# Patient Record
Sex: FEMALE | Race: WHITE | NOT HISPANIC OR LATINO
[De-identification: names, ages, dates, MRNs, and addresses within clinical notes are randomized per-mention and may not be internally consistent; named-entity substitution may affect disease eponyms.]

---

## 2017-06-12 ENCOUNTER — RESULT REVIEW (OUTPATIENT)
Age: 82
End: 2017-06-12

## 2017-06-12 ENCOUNTER — APPOINTMENT (OUTPATIENT)
Dept: DERMATOLOGY | Facility: CLINIC | Age: 82
End: 2017-06-12

## 2017-07-11 ENCOUNTER — APPOINTMENT (OUTPATIENT)
Dept: DERMATOLOGY | Facility: CLINIC | Age: 82
End: 2017-07-11

## 2017-09-06 ENCOUNTER — APPOINTMENT (OUTPATIENT)
Dept: DERMATOLOGY | Facility: CLINIC | Age: 82
End: 2017-09-06
Payer: MEDICARE

## 2017-09-06 PROCEDURE — 99212 OFFICE O/P EST SF 10 MIN: CPT | Mod: 25

## 2017-09-06 PROCEDURE — 11901 INJECT SKIN LESIONS >7: CPT

## 2017-09-25 ENCOUNTER — APPOINTMENT (OUTPATIENT)
Dept: DERMATOLOGY | Facility: CLINIC | Age: 82
End: 2017-09-25

## 2017-12-02 ENCOUNTER — APPOINTMENT (OUTPATIENT)
Dept: DERMATOLOGY | Facility: CLINIC | Age: 82
End: 2017-12-02

## 2018-10-14 ENCOUNTER — INPATIENT (INPATIENT)
Facility: HOSPITAL | Age: 83
LOS: 3 days | Discharge: NURSING, REHAB OR LONG-TERM CARE | DRG: 481 | End: 2018-10-18
Attending: INTERNAL MEDICINE | Admitting: STUDENT IN AN ORGANIZED HEALTH CARE EDUCATION/TRAINING PROGRAM
Payer: MEDICARE

## 2018-10-14 VITALS
WEIGHT: 100.09 LBS | TEMPERATURE: 98 F | HEART RATE: 69 BPM | RESPIRATION RATE: 18 BRPM | HEIGHT: 63 IN | SYSTOLIC BLOOD PRESSURE: 127 MMHG | OXYGEN SATURATION: 97 % | DIASTOLIC BLOOD PRESSURE: 74 MMHG

## 2018-10-14 PROCEDURE — 99285 EMERGENCY DEPT VISIT HI MDM: CPT

## 2018-10-14 NOTE — ED ADULT TRIAGE NOTE - CHIEF COMPLAINT QUOTE
pt biba s/p mechanical fall.  pt states she was washing dishes, she turned around and missed her walker.  c/o left hip pain.  no head trauma.  no blood thinners.  no obvious deformities.

## 2018-10-15 ENCOUNTER — TRANSCRIPTION ENCOUNTER (OUTPATIENT)
Age: 83
End: 2018-10-15

## 2018-10-15 DIAGNOSIS — Z90.11 ACQUIRED ABSENCE OF RIGHT BREAST AND NIPPLE: Chronic | ICD-10-CM

## 2018-10-15 DIAGNOSIS — I48.2 CHRONIC ATRIAL FIBRILLATION: ICD-10-CM
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
## 2018-10-15 DIAGNOSIS — Z29.9 ENCOUNTER FOR PROPHYLACTIC MEASURES, UNSPECIFIED: ICD-10-CM

## 2018-10-15 DIAGNOSIS — S72.009A FRACTURE OF UNSPECIFIED PART OF NECK OF UNSPECIFIED FEMUR, INITIAL ENCOUNTER FOR CLOSED FRACTURE: ICD-10-CM

## 2018-10-15 DIAGNOSIS — S72.142A DISPLACED INTERTROCHANTERIC FRACTURE OF LEFT FEMUR, INITIAL ENCOUNTER FOR CLOSED FRACTURE: ICD-10-CM

## 2018-10-15 LAB
ALBUMIN SERPL ELPH-MCNC: 3.9 G/DL — SIGNIFICANT CHANGE UP (ref 3.3–5.2)
ALBUMIN SERPL ELPH-MCNC: 4 G/DL — SIGNIFICANT CHANGE UP (ref 3.3–5.2)
ALP SERPL-CCNC: 73 U/L — SIGNIFICANT CHANGE UP (ref 40–120)
ALP SERPL-CCNC: 77 U/L — SIGNIFICANT CHANGE UP (ref 40–120)
ALT FLD-CCNC: 11 U/L — SIGNIFICANT CHANGE UP
ALT FLD-CCNC: 13 U/L — SIGNIFICANT CHANGE UP
ANION GAP SERPL CALC-SCNC: 14 MMOL/L — SIGNIFICANT CHANGE UP (ref 5–17)
ANION GAP SERPL CALC-SCNC: 14 MMOL/L — SIGNIFICANT CHANGE UP (ref 5–17)
APTT BLD: 26.2 SEC — LOW (ref 27.5–37.4)
AST SERPL-CCNC: 26 U/L — SIGNIFICANT CHANGE UP
AST SERPL-CCNC: 27 U/L — SIGNIFICANT CHANGE UP
BASOPHILS # BLD AUTO: 0 K/UL — SIGNIFICANT CHANGE UP (ref 0–0.2)
BASOPHILS # BLD AUTO: 0 K/UL — SIGNIFICANT CHANGE UP (ref 0–0.2)
BASOPHILS NFR BLD AUTO: 0.1 % — SIGNIFICANT CHANGE UP (ref 0–2)
BASOPHILS NFR BLD AUTO: 0.1 % — SIGNIFICANT CHANGE UP (ref 0–2)
BILIRUB SERPL-MCNC: 0.2 MG/DL — LOW (ref 0.4–2)
BILIRUB SERPL-MCNC: 0.4 MG/DL — SIGNIFICANT CHANGE UP (ref 0.4–2)
BLD GP AB SCN SERPL QL: SIGNIFICANT CHANGE UP
BUN SERPL-MCNC: 24 MG/DL — HIGH (ref 8–20)
BUN SERPL-MCNC: 25 MG/DL — HIGH (ref 8–20)
CALCIUM SERPL-MCNC: 9.5 MG/DL — SIGNIFICANT CHANGE UP (ref 8.6–10.2)
CALCIUM SERPL-MCNC: 9.8 MG/DL — SIGNIFICANT CHANGE UP (ref 8.6–10.2)
CHLORIDE SERPL-SCNC: 101 MMOL/L — SIGNIFICANT CHANGE UP (ref 98–107)
CHLORIDE SERPL-SCNC: 102 MMOL/L — SIGNIFICANT CHANGE UP (ref 98–107)
CO2 SERPL-SCNC: 21 MMOL/L — LOW (ref 22–29)
CO2 SERPL-SCNC: 21 MMOL/L — LOW (ref 22–29)
CREAT SERPL-MCNC: 0.53 MG/DL — SIGNIFICANT CHANGE UP (ref 0.5–1.3)
CREAT SERPL-MCNC: 0.53 MG/DL — SIGNIFICANT CHANGE UP (ref 0.5–1.3)
EOSINOPHIL # BLD AUTO: 0 K/UL — SIGNIFICANT CHANGE UP (ref 0–0.5)
EOSINOPHIL # BLD AUTO: 0 K/UL — SIGNIFICANT CHANGE UP (ref 0–0.5)
EOSINOPHIL NFR BLD AUTO: 0.1 % — SIGNIFICANT CHANGE UP (ref 0–6)
EOSINOPHIL NFR BLD AUTO: 0.6 % — SIGNIFICANT CHANGE UP (ref 0–6)
GLUCOSE SERPL-MCNC: 103 MG/DL — SIGNIFICANT CHANGE UP (ref 70–115)
GLUCOSE SERPL-MCNC: 118 MG/DL — HIGH (ref 70–115)
HCT VFR BLD CALC: 32.4 % — LOW (ref 37–47)
HCT VFR BLD CALC: 34.2 % — LOW (ref 37–47)
HGB BLD-MCNC: 10.2 G/DL — LOW (ref 12–16)
HGB BLD-MCNC: 11.1 G/DL — LOW (ref 12–16)
INR BLD: 0.96 RATIO — SIGNIFICANT CHANGE UP (ref 0.88–1.16)
INR BLD: 1 RATIO — SIGNIFICANT CHANGE UP (ref 0.88–1.16)
LIDOCAIN IGE QN: 62 U/L — HIGH (ref 22–51)
LYMPHOCYTES # BLD AUTO: 1 K/UL — SIGNIFICANT CHANGE UP (ref 1–4.8)
LYMPHOCYTES # BLD AUTO: 1.1 K/UL — SIGNIFICANT CHANGE UP (ref 1–4.8)
LYMPHOCYTES # BLD AUTO: 14.4 % — LOW (ref 20–55)
LYMPHOCYTES # BLD AUTO: 8.6 % — LOW (ref 20–55)
MAGNESIUM SERPL-MCNC: 2 MG/DL — SIGNIFICANT CHANGE UP (ref 1.8–2.6)
MCHC RBC-ENTMCNC: 29.7 PG — SIGNIFICANT CHANGE UP (ref 27–31)
MCHC RBC-ENTMCNC: 29.9 PG — SIGNIFICANT CHANGE UP (ref 27–31)
MCHC RBC-ENTMCNC: 31.5 G/DL — LOW (ref 32–36)
MCHC RBC-ENTMCNC: 32.5 G/DL — SIGNIFICANT CHANGE UP (ref 32–36)
MCV RBC AUTO: 92.2 FL — SIGNIFICANT CHANGE UP (ref 81–99)
MCV RBC AUTO: 94.2 FL — SIGNIFICANT CHANGE UP (ref 81–99)
MONOCYTES # BLD AUTO: 0.5 K/UL — SIGNIFICANT CHANGE UP (ref 0–0.8)
MONOCYTES # BLD AUTO: 0.6 K/UL — SIGNIFICANT CHANGE UP (ref 0–0.8)
MONOCYTES NFR BLD AUTO: 5.5 % — SIGNIFICANT CHANGE UP (ref 3–10)
MONOCYTES NFR BLD AUTO: 6.5 % — SIGNIFICANT CHANGE UP (ref 3–10)
NEUTROPHILS # BLD AUTO: 6 K/UL — SIGNIFICANT CHANGE UP (ref 1.8–8)
NEUTROPHILS # BLD AUTO: 9.5 K/UL — HIGH (ref 1.8–8)
NEUTROPHILS NFR BLD AUTO: 78 % — HIGH (ref 37–73)
NEUTROPHILS NFR BLD AUTO: 85.3 % — HIGH (ref 37–73)
PHOSPHATE SERPL-MCNC: 3.4 MG/DL — SIGNIFICANT CHANGE UP (ref 2.4–4.7)
PLATELET # BLD AUTO: 303 K/UL — SIGNIFICANT CHANGE UP (ref 150–400)
PLATELET # BLD AUTO: 361 K/UL — SIGNIFICANT CHANGE UP (ref 150–400)
POTASSIUM SERPL-MCNC: 3.9 MMOL/L — SIGNIFICANT CHANGE UP (ref 3.5–5.3)
POTASSIUM SERPL-MCNC: 4.2 MMOL/L — SIGNIFICANT CHANGE UP (ref 3.5–5.3)
POTASSIUM SERPL-SCNC: 3.9 MMOL/L — SIGNIFICANT CHANGE UP (ref 3.5–5.3)
POTASSIUM SERPL-SCNC: 4.2 MMOL/L — SIGNIFICANT CHANGE UP (ref 3.5–5.3)
PROT SERPL-MCNC: 7.9 G/DL — SIGNIFICANT CHANGE UP (ref 6.6–8.7)
PROT SERPL-MCNC: 7.9 G/DL — SIGNIFICANT CHANGE UP (ref 6.6–8.7)
PROTHROM AB SERPL-ACNC: 10.5 SEC — SIGNIFICANT CHANGE UP (ref 9.8–12.7)
PROTHROM AB SERPL-ACNC: 11 SEC — SIGNIFICANT CHANGE UP (ref 9.8–12.7)
RBC # BLD: 3.44 M/UL — LOW (ref 4.4–5.2)
RBC # BLD: 3.71 M/UL — LOW (ref 4.4–5.2)
RBC # FLD: 14.1 % — SIGNIFICANT CHANGE UP (ref 11–15.6)
RBC # FLD: 14.6 % — SIGNIFICANT CHANGE UP (ref 11–15.6)
SODIUM SERPL-SCNC: 136 MMOL/L — SIGNIFICANT CHANGE UP (ref 135–145)
SODIUM SERPL-SCNC: 137 MMOL/L — SIGNIFICANT CHANGE UP (ref 135–145)
TROPONIN T SERPL-MCNC: <0.01 NG/ML — SIGNIFICANT CHANGE UP (ref 0–0.06)
TYPE + AB SCN PNL BLD: SIGNIFICANT CHANGE UP
WBC # BLD: 11.2 K/UL — HIGH (ref 4.8–10.8)
WBC # BLD: 7.7 K/UL — SIGNIFICANT CHANGE UP (ref 4.8–10.8)
WBC # FLD AUTO: 11.2 K/UL — HIGH (ref 4.8–10.8)
WBC # FLD AUTO: 7.7 K/UL — SIGNIFICANT CHANGE UP (ref 4.8–10.8)

## 2018-10-15 PROCEDURE — 93306 TTE W/DOPPLER COMPLETE: CPT | Mod: 26

## 2018-10-15 PROCEDURE — 70450 CT HEAD/BRAIN W/O DYE: CPT | Mod: 26

## 2018-10-15 PROCEDURE — 73502 X-RAY EXAM HIP UNI 2-3 VIEWS: CPT | Mod: 26,LT

## 2018-10-15 PROCEDURE — 99223 1ST HOSP IP/OBS HIGH 75: CPT

## 2018-10-15 PROCEDURE — 93010 ELECTROCARDIOGRAM REPORT: CPT

## 2018-10-15 PROCEDURE — 71045 X-RAY EXAM CHEST 1 VIEW: CPT | Mod: 26

## 2018-10-15 PROCEDURE — 99222 1ST HOSP IP/OBS MODERATE 55: CPT | Mod: 57

## 2018-10-15 PROCEDURE — 73552 X-RAY EXAM OF FEMUR 2/>: CPT | Mod: 26,LT

## 2018-10-15 PROCEDURE — 99233 SBSQ HOSP IP/OBS HIGH 50: CPT

## 2018-10-15 PROCEDURE — 12345: CPT | Mod: NC

## 2018-10-15 RX ORDER — VENLAFAXINE HCL 75 MG
37.5 CAPSULE, EXT RELEASE 24 HR ORAL DAILY
Qty: 0 | Refills: 0 | Status: DISCONTINUED | OUTPATIENT
Start: 2018-10-15 | End: 2018-10-16

## 2018-10-15 RX ORDER — SODIUM CHLORIDE 9 MG/ML
1000 INJECTION, SOLUTION INTRAVENOUS
Qty: 0 | Refills: 0 | Status: DISCONTINUED | OUTPATIENT
Start: 2018-10-15 | End: 2018-10-16

## 2018-10-15 RX ORDER — ONDANSETRON 8 MG/1
4 TABLET, FILM COATED ORAL EVERY 6 HOURS
Qty: 0 | Refills: 0 | Status: DISCONTINUED | OUTPATIENT
Start: 2018-10-15 | End: 2018-10-16

## 2018-10-15 RX ORDER — PANTOPRAZOLE SODIUM 20 MG/1
0 TABLET, DELAYED RELEASE ORAL
Qty: 0 | Refills: 0 | COMMUNITY

## 2018-10-15 RX ORDER — MORPHINE SULFATE 50 MG/1
2 CAPSULE, EXTENDED RELEASE ORAL ONCE
Qty: 0 | Refills: 0 | Status: DISCONTINUED | OUTPATIENT
Start: 2018-10-15 | End: 2018-10-15

## 2018-10-15 RX ORDER — ALPRAZOLAM 0.25 MG
0 TABLET ORAL
Qty: 0 | Refills: 0 | COMMUNITY

## 2018-10-15 RX ORDER — PANTOPRAZOLE SODIUM 20 MG/1
40 TABLET, DELAYED RELEASE ORAL
Qty: 0 | Refills: 0 | Status: DISCONTINUED | OUTPATIENT
Start: 2018-10-15 | End: 2018-10-16

## 2018-10-15 RX ORDER — ENOXAPARIN SODIUM 100 MG/ML
40 INJECTION SUBCUTANEOUS ONCE
Qty: 0 | Refills: 0 | Status: COMPLETED | OUTPATIENT
Start: 2018-10-15 | End: 2018-10-15

## 2018-10-15 RX ORDER — MORPHINE SULFATE 50 MG/1
4 CAPSULE, EXTENDED RELEASE ORAL EVERY 6 HOURS
Qty: 0 | Refills: 0 | Status: DISCONTINUED | OUTPATIENT
Start: 2018-10-15 | End: 2018-10-16

## 2018-10-15 RX ORDER — OXYCODONE HYDROCHLORIDE 5 MG/1
5 TABLET ORAL EVERY 4 HOURS
Qty: 0 | Refills: 0 | Status: DISCONTINUED | OUTPATIENT
Start: 2018-10-15 | End: 2018-10-16

## 2018-10-15 RX ORDER — OXYCODONE HYDROCHLORIDE 5 MG/1
10 TABLET ORAL EVERY 4 HOURS
Qty: 0 | Refills: 0 | Status: DISCONTINUED | OUTPATIENT
Start: 2018-10-15 | End: 2018-10-16

## 2018-10-15 RX ORDER — ALPRAZOLAM 0.25 MG
0.5 TABLET ORAL EVERY 6 HOURS
Qty: 0 | Refills: 0 | Status: DISCONTINUED | OUTPATIENT
Start: 2018-10-15 | End: 2018-10-16

## 2018-10-15 RX ORDER — CEFAZOLIN SODIUM 1 G
2000 VIAL (EA) INJECTION ONCE
Qty: 0 | Refills: 0 | Status: DISCONTINUED | OUTPATIENT
Start: 2018-10-15 | End: 2018-10-16

## 2018-10-15 RX ORDER — VENLAFAXINE HCL 75 MG
1 CAPSULE, EXT RELEASE 24 HR ORAL
Qty: 0 | Refills: 0 | COMMUNITY

## 2018-10-15 RX ORDER — CHOLECALCIFEROL (VITAMIN D3) 125 MCG
1 CAPSULE ORAL
Qty: 0 | Refills: 0 | COMMUNITY

## 2018-10-15 RX ADMIN — Medication 37.5 MILLIGRAM(S): at 12:28

## 2018-10-15 RX ADMIN — ENOXAPARIN SODIUM 40 MILLIGRAM(S): 100 INJECTION SUBCUTANEOUS at 12:28

## 2018-10-15 RX ADMIN — PANTOPRAZOLE SODIUM 40 MILLIGRAM(S): 20 TABLET, DELAYED RELEASE ORAL at 12:28

## 2018-10-15 RX ADMIN — MORPHINE SULFATE 2 MILLIGRAM(S): 50 CAPSULE, EXTENDED RELEASE ORAL at 04:28

## 2018-10-15 RX ADMIN — MORPHINE SULFATE 4 MILLIGRAM(S): 50 CAPSULE, EXTENDED RELEASE ORAL at 22:35

## 2018-10-15 RX ADMIN — SODIUM CHLORIDE 80 MILLILITER(S): 9 INJECTION, SOLUTION INTRAVENOUS at 22:23

## 2018-10-15 RX ADMIN — MORPHINE SULFATE 4 MILLIGRAM(S): 50 CAPSULE, EXTENDED RELEASE ORAL at 22:24

## 2018-10-15 RX ADMIN — MORPHINE SULFATE 2 MILLIGRAM(S): 50 CAPSULE, EXTENDED RELEASE ORAL at 03:04

## 2018-10-15 RX ADMIN — SODIUM CHLORIDE 80 MILLILITER(S): 9 INJECTION, SOLUTION INTRAVENOUS at 04:28

## 2018-10-15 NOTE — H&P ADULT - FAMILY HISTORY
Father  Still living? Unknown  No family history of disorders, Age at diagnosis: Age Unknown     Mother  Still living? Unknown  No family history of disorders, Age at diagnosis: Age Unknown

## 2018-10-15 NOTE — H&P ADULT - ASSESSMENT
84 yo female with pmh/o HTN, a-fib and breast CA, b/l prox humureus fxs in 2015, who ambulates with walker and appears to have frequent falls as per EMR review, who is admitted today due to Left IT fracture after sustaining mechanical fall.

## 2018-10-15 NOTE — ED PROVIDER NOTE - OBJECTIVE STATEMENT
86 y/o F pt with hx of HTN presents to the ED c/o of fall. Pt reports she was at the sink washing the dishes, had her walker close, heard a noise, she tried to grab walker but couldn't reach it, and then she fell on left side, wasn't able to get up. Pt reports she hit the back of her head, blade, arm, and shoulder. Denies SOB, LOC, and numbness. No further acute complaints at this time. 84 y/o F pt with hx of HTN presents to the ED c/o a fall. Pt reports she was at the sink washing the dishes, she had her walker close, heard a noise, and she tried to grab her walker but couldn't reach it, and then she fell on her left side, and wasn't able to get up. Pt reports she hit the back of her head, shoulder blade, arm, and shoulder. Denies SOB, LOC, and numbness. No further acute complaints at this time. 84 y/o F pt with hx of HTN, a-fib and breast CA presents to the ED c/o L hip pain s/p mechanical trip and fall tonight while at home. Pt reports she was at the sink washing the dishes, when she went to walk away from the sink, reached for her walker, but missed and fell on her left side and hit the back of her head. She was unable to get herself off the ground or move after the fall. Denies chest pain, difficulty breathing, headache, LOC, or tingling/numbness. No further acute complaints at this time. 86 y/o F pt with hx of HTN, a-fib and breast CA and PSHx of partial R mastectomy presents to the ED c/o L hip pain s/p mechanical trip and fall tonight while at home. Pt reports she was at the sink washing the dishes, when she went to walk away from the sink, reached for her walker, but missed, fell on her left side and hit the back of her head. She was unable to get herself off the ground or move after the fall. Her pain is made worse with movement and finds slight relief with immobilization. Denies chest pain, difficulty breathing, headache, LOC, or tingling/numbness. She does note take anticoagulation medications. She did not take medications OTC for pain. No further acute complaints at this time.

## 2018-10-15 NOTE — PROGRESS NOTE ADULT - SUBJECTIVE AND OBJECTIVE BOX
ADRIÁN MARTE    [id number]    85y      Female    CC: left hip fracture    INTERVAL HPI/OVERNIGHT EVENTS:  Pt is an 84 y/o F pt with a pmhx of HTN, a-fib and breast CA, b/l prox humerus fxs, frequent falls as per EMR review and PSHx of partial R mastectomy who presents to the ED c/o L hip pain with inability to ambulate after having a fall while at home. Pt ambulates with walker and tripped attempting to reach it to ambulate away from sink where she was washing dishes. Pt had similar episode in 2016 requiring sutures for head laceration and prior to that for which she sustained b/l humeral fractures. Pt did hit head but denies losing consciousness and any preceding symptoms including palpitations, sob, chest pain, HA, visual changes, numbness, tingling, tongue biting, urinary or fecal incontinence.     this morning pt states pain is well controlled and waiting for her surgery to take place, which will take place 10/16/18    REVIEW OF SYSTEMS:    CONSTITUTIONAL: No fever, weight loss, or fatigue  RESPIRATORY: No cough, wheezing, hemoptysis; No shortness of breath  CARDIOVASCULAR: No chest pain, palpitations  GASTROINTESTINAL: No abdominal or epigastric pain. No nausea, vomiting      Vital Signs Last 24 Hrs  T(C): 36.9 (15 Oct 2018 10:00), Max: 36.9 (15 Oct 2018 10:00)  T(F): 98.5 (15 Oct 2018 10:00), Max: 98.5 (15 Oct 2018 10:00)  HR: 78 (15 Oct 2018 10:00) (68 - 78)  BP: 106/73 (15 Oct 2018 10:00) (106/73 - 127/74)  BP(mean): --  RR: 18 (15 Oct 2018 10:00) (18 - 18)  SpO2: 98% (15 Oct 2018 10:00) (97% - 99%)    PHYSICAL EXAM:    GENERAL: NAD, well-groomed  HEENT: PERRL, +EOMI  CHEST/LUNG: Clear to auscultation bilaterally; No wheezing  HEART: S1S2+, Regular rate and rhythm; No murmurs  ABDOMEN: Soft, Nontender, Nondistended; Bowel sounds present  EXTREMITIES:  2+ Peripheral Pulses, No clubbing, cyanosis, or edema      LABS:                        11.1   11.2  )-----------( 361      ( 15 Oct 2018 03:12 )             34.2     10-15    137  |  102  |  25.0<H>  ----------------------------<  118<H>  4.2   |  21.0<L>  |  0.53    Ca    9.5      15 Oct 2018 03:12    TPro  7.9  /  Alb  4.0  /  TBili  0.2<L>  /  DBili  x   /  AST  27  /  ALT  13  /  AlkPhos  73  10-15    PT/INR - ( 15 Oct 2018 03:12 )   PT: 11.0 sec;   INR: 1.00 ratio         MEDICATIONS  (STANDING):  ceFAZolin   IVPB 2000 milliGRAM(s) IV Intermittent once  lactated ringers. 1000 milliLiter(s) (80 mL/Hr) IV Continuous <Continuous>  pantoprazole    Tablet 40 milliGRAM(s) Oral before breakfast  venlafaxine XR. 37.5 milliGRAM(s) Oral daily    MEDICATIONS  (PRN):  ALPRAZolam 0.5 milliGRAM(s) Oral every 6 hours PRN anxiety  morphine  - Injectable 4 milliGRAM(s) IV Push every 6 hours PRN Severe Pain (7 - 10)  ondansetron Injectable 4 milliGRAM(s) IV Push every 6 hours PRN Nausea  oxyCODONE    IR 5 milliGRAM(s) Oral every 4 hours PRN Mild Pain (1 - 3)  oxyCODONE    IR 10 milliGRAM(s) Oral every 4 hours PRN Moderate Pain (4 - 6)      RADIOLOGY & ADDITIONAL TESTS:  2Decho:  1. Technically difficult study.   2. Normal global left ventricular systolic function.   3. Left ventricular ejection fraction, by visual estimation, is 65 to   70%.   4. Basal inferior segment is abnormal as described above.   5. Spectral Doppler shows impaired relaxation pattern of left   ventricular myocardial filling (Grade I diastolic dysfunction).   6. Posterior leaflet of mitral valve is thickened. There is mild   posterior mitral valve prolapse, mild mitral valve regurgitaiton.   7. Elevated left atrial and left ventricular end-diastolic pressures.   8. Severely enlarged left atrium.   9. Mild aortic regurgitation.  10. Sclerotic aortic valve with decreased opening.No significant aortic   valve stenosis.  11. Trace tricuspid regurgitation.  12. Estimated pulmonary artery systolic pressure is 35.6 mmHg assuming a   right atrial pressure of 10 mmHg, which is consistent with borderline   pulmonary hypertension.  13. There is no evidence of pericardial effusion.  14. Incidental finding of gallstones.    [id number] Levi Calvert MD, Electronically signed on 10/15/2018 at 11:37:22   AM

## 2018-10-15 NOTE — ED ADULT NURSE NOTE - NSIMPLEMENTINTERV_GEN_ALL_ED
Implemented All Universal Safety Interventions:  [place] to call system. Call bell, personal items and telephone within reach. Instruct patient to call for assistance. Room bathroom lighting operational. Non-slip footwear when patient is off stretcher. Physically safe environment: no spills, clutter or unnecessary equipment. Stretcher in lowest position, wheels locked, appropriate side rails in place.

## 2018-10-15 NOTE — H&P ADULT - PMH
Atrial fibrillation    Breast cancer    Closed fracture of lower extremity, unspecified laterality, initial encounter    Fall, initial encounter    Hypertension

## 2018-10-15 NOTE — PROGRESS NOTE ADULT - SUBJECTIVE AND OBJECTIVE BOX
Pt Seen.  Chart Reviewed.  This is a 85 y.o. female presents for Intramedullary Nailing Left Intertrochanteric Fracture, S/P Fall at Home.  Ambulates with walker.  PMHX: Afib, HTN, Breast Ca, S/P Partial Mastectomy.  No anticoagulation, No antihypertensive meds.  EKG NSR.    ECHO: Nml LV Systolic Function.  EF 60-65%, Severe LAE.  Meds: Ancef, Protonix, Lovenox, Venlafaxene.  Labs: Hgb 11.1, Hct 34.2, plts 361    VS: 36.8  68  118/72  18  99%  cxr: Cardiomegaly, Small Left Pleural Effusion  NKDA  MP 2  45.4 KG    Plan: General Anesthesia vs Regional.  Cardiac Clearance on chart.  Will continue to monitor.  Labs reviewed.

## 2018-10-15 NOTE — H&P ADULT - HISTORY OF PRESENT ILLNESS
Pt is an 86 y/o F pt with a pmhx of HTN, a-fib and breast CA, b/l prox humureus fxs, frequent falls as per EMR review and PSHx of partial R mastectomy who today  presents to the ED c/o L hip pain with inability to ambulate after having a fall tonight while at home. Pt ambulates with walker and tripped attempting to reach it to ambulate away from sink where she was washing dishes. Pt had similar episode in 2016 requiring sutures for head laceration and prior to that for which she sustained b/l humeral fractures. Pt did hit head but denies losing consciousness and any preceding symptoms including palpitations, sob, chest pain, HA, visual changes, numbness, tingling, tongue biting, urinary or fecal incontinence.

## 2018-10-15 NOTE — ED PROVIDER NOTE - NEURO NEGATIVE STATEMENT, MLM
no loss of consciousness, no gait abnormality, no headache, no sensory deficits, and no weakness and no numbness.

## 2018-10-15 NOTE — CONSULT NOTE ADULT - ATTENDING COMMENTS
Ortho Trauma Attending:  Agree with above PA note.  Note edited where necessary.  Plan for OR 10/15 or 10/16 pending medical clearance and OR availability. Orthopedics ready to proceed.    Juan Zaidi MD  Orthopaedic Trauma Surgery
pre-operative cardiovascular risk evaluation and management :   No further cardiac work. Proceed for surgery as indicated. benefits of surgery outweight the risk,

## 2018-10-15 NOTE — CONSULT NOTE ADULT - SUBJECTIVE AND OBJECTIVE BOX
Pt Name: ADRIÁN MARTE    MRN: [id number]      Patient is a 85y Female presenting to the emergency department with a chief complaint of left hip pain s/p mechanical fall today. Pt states that she was washing dishes at home when she slipped and fell on her left side. Pt admits to hitting her head. Pt denies c/p, sob, abdominal pain, n/v, numbness/tingling and has no other complaints.    REVIEW OF SYSTEMS    General: Alert, responsive, in NAD    Skin/Breast: No rashes, no pruritis   	  Ophthalmologic: No visual changes. No redness.   	  ENMT:	No discharge. No swelling.    Respiratory and Thorax: No difficulty breathing. No cough.  	   Cardiovascular:	No chest pain. No palpitations.    Gastrointestinal:	 No abdominal pain. No diarrhea.     Genitourinary: No dysuria. No bleeding.    Musculoskeletal: SEE HPI.    Neurological: No sensory or motor changes.     Psychiatric: No anxiety or depression.    Hematology/Lymphatics: No swelling.    Endocrine: No Hx of diabetes.    ROS is otherwise negative.    PAST MEDICAL & SURGICAL HISTORY:  PAST MEDICAL & SURGICAL HISTORY:  Breast cancer  Hypertension  Atrial fibrillation  S/P partial mastectomy, right      Allergies: No Known Allergies      Medications: ceFAZolin   IVPB 2000 milliGRAM(s) IV Intermittent once  lactated ringers. 1000 milliLiter(s) IV Continuous <Continuous>  morphine  - Injectable 4 milliGRAM(s) IV Push every 6 hours PRN  morphine  - Injectable 2 milliGRAM(s) IV Push once  oxyCODONE    IR 5 milliGRAM(s) Oral every 4 hours PRN  oxyCODONE    IR 10 milliGRAM(s) Oral every 4 hours PRN      FAMILY HISTORY:  : non-contributory    Social History: Denies ETOH abuse.    Ambulation: Walking independently [ x ] With Cane [ ] With Walker [ ]  Bedbound [ ]                           11.1   11.2  )-----------( 361      ( 15 Oct 2018 03:12 )             34.2       10-15    137  |  102  |  25.0<H>  ----------------------------<  118<H>  4.2   |  21.0<L>  |  0.53    Ca    9.5      15 Oct 2018 03:12    TPro  7.9  /  Alb  4.0  /  TBili  0.2<L>  /  DBili  x   /  AST  27  /  ALT  13  /  AlkPhos  73  10-15      Vital Signs Last 24 Hrs  T(C): 36.8 (14 Oct 2018 23:33), Max: 36.8 (14 Oct 2018 23:33)  T(F): 98.2 (14 Oct 2018 23:33), Max: 98.2 (14 Oct 2018 23:33)  HR: 69 (14 Oct 2018 23:33) (69 - 69)  BP: 120/66 (15 Oct 2018 02:37) (120/66 - 127/74)  BP(mean): --  RR: 18 (14 Oct 2018 23:33) (18 - 18)  SpO2: 97% (14 Oct 2018 23:33) (97% - 97%)    Daily Height in cm: 160.02 (14 Oct 2018 23:33)    Daily       PHYSICAL EXAM:      Appearance: Alert, responsive, in no acute distress.    Neurological: Sensation is grossly intact to light touch. 5/5 motor function of all extremities. No focal deficits or weaknesses found.    Skin: no rash on visible skin. Skin is clean, dry and intact. No bleeding. No abrasions. No ulcerations.    Vascular: 2+ distal pulses. Cap refill < 2 sec. No signs of venous insufficiency or stasis. No extremity ulcerations. No cyanosis.    Musculoskeletal:         Left Upper Extremity:  + NROM. Non-tender. No signs of trauma.        Right Upper Extremity:  + NROM. Non-tender. No signs of trauma.        Left Lower Extremity: +shortened and externally rotated LLE, +TTP of the left hip with decreased ROM noted due to reported pain. No TTP of the knee/ankle. +plantar/dorsiflexion/EHL/FHL intact. Compartments soft and compressible. No active bleeding/laceration noted.       Right Lower Extremity:  + NROM. Non-tender. No signs of trauma.     Imaging Studies: All imaging reviewed with Dr. Zaidi.    A/P:  Pt is a  85y Female with a left IT fracture s/p mechanical fall today.    PLAN d/w Dr. Zaidi:   * NPO for OR today  * IV fluids ordered and to start once NPO  * Pre-operative ABX ordered  * Routine daily anticoagulation held for OR  * Medical clearance requested for procedure  * Bed rest Pt Name: ADRIÁN MARTE    MRN: [id number]      Patient is a 85y Female presenting to the emergency department with a chief complaint of left hip pain s/p mechanical fall today. Pt states that she was washing dishes at home when she slipped and fell on her left side. Pt admits to hitting her head. Pt denies c/p, sob, abdominal pain, n/v, numbness/tingling and has no other complaints.    REVIEW OF SYSTEMS    General: Alert, responsive, in NAD    Skin/Breast: No rashes, no pruritis   	  Ophthalmologic: No visual changes. No redness.   	  ENMT:	No discharge. No swelling.    Respiratory and Thorax: No difficulty breathing. No cough.  	   Cardiovascular:	No chest pain. No palpitations.    Gastrointestinal:	 No abdominal pain. No diarrhea.     Genitourinary: No dysuria. No bleeding.    Musculoskeletal: SEE HPI.    Neurological: No sensory or motor changes.     Psychiatric: No anxiety or depression.    Hematology/Lymphatics: No swelling.    Endocrine: No Hx of diabetes.    ROS is otherwise negative.    PAST MEDICAL & SURGICAL HISTORY:  PAST MEDICAL & SURGICAL HISTORY:  Breast cancer  Hypertension  Atrial fibrillation  S/P partial mastectomy, right      Allergies: No Known Allergies      Medications: ceFAZolin   IVPB 2000 milliGRAM(s) IV Intermittent once  lactated ringers. 1000 milliLiter(s) IV Continuous <Continuous>  morphine  - Injectable 4 milliGRAM(s) IV Push every 6 hours PRN  morphine  - Injectable 2 milliGRAM(s) IV Push once  oxyCODONE    IR 5 milliGRAM(s) Oral every 4 hours PRN  oxyCODONE    IR 10 milliGRAM(s) Oral every 4 hours PRN      FAMILY HISTORY:  : non-contributory    Social History: Denies ETOH abuse.    Ambulation: Walking independently [ x ] With Cane [ ] With Walker [ ]  Bedbound [ ]                           11.1   11.2  )-----------( 361      ( 15 Oct 2018 03:12 )             34.2       10-15    137  |  102  |  25.0<H>  ----------------------------<  118<H>  4.2   |  21.0<L>  |  0.53    Ca    9.5      15 Oct 2018 03:12    TPro  7.9  /  Alb  4.0  /  TBili  0.2<L>  /  DBili  x   /  AST  27  /  ALT  13  /  AlkPhos  73  10-15      Vital Signs Last 24 Hrs  T(C): 36.8 (14 Oct 2018 23:33), Max: 36.8 (14 Oct 2018 23:33)  T(F): 98.2 (14 Oct 2018 23:33), Max: 98.2 (14 Oct 2018 23:33)  HR: 69 (14 Oct 2018 23:33) (69 - 69)  BP: 120/66 (15 Oct 2018 02:37) (120/66 - 127/74)  BP(mean): --  RR: 18 (14 Oct 2018 23:33) (18 - 18)  SpO2: 97% (14 Oct 2018 23:33) (97% - 97%)    Daily Height in cm: 160.02 (14 Oct 2018 23:33)    Daily       PHYSICAL EXAM:      Appearance: Alert, responsive, in no acute distress.    Neurological: Sensation is grossly intact to light touch. 5/5 motor function of all extremities. No focal deficits or weaknesses found.    Skin: no rash on visible skin. Skin is clean, dry and intact. No bleeding. No abrasions. No ulcerations.    Vascular: 2+ distal pulses. Cap refill < 2 sec. No signs of venous insufficiency or stasis. No extremity ulcerations. No cyanosis.    Musculoskeletal:         Left Upper Extremity:  + NROM. Non-tender. No signs of trauma.        Right Upper Extremity:  + NROM. Non-tender. No signs of trauma.        Left Lower Extremity: +shortened and externally rotated LLE, +TTP of the left hip with decreased ROM noted due to reported pain. No TTP of the knee/ankle. +plantar/dorsiflexion/EHL/FHL intact. Compartments soft and compressible. No active bleeding/laceration noted.       Right Lower Extremity:  + NROM. Non-tender. No signs of trauma.     Imaging Studies: All imaging reviewed with Dr. Zaidi.    A/P:  Pt is a  85y Female with a left IT fracture s/p mechanical fall today.    PLAN d/w Dr. Zaidi:   * NPO for OR 10/15 or 10/16 pending clearance and OR availability  * IV fluids ordered and to start once NPO  * Pre-operative ABX ordered  * Routine daily anticoagulation held for OR  * Medical clearance requested for procedure  * Bed rest

## 2018-10-15 NOTE — CONSULT NOTE ADULT - SUBJECTIVE AND OBJECTIVE BOX
Consult requested by:  Dr. Hunter    Reason for Consultation: Mercedes-operative Cardiac Risk Stratification    History obtained by: Patient and medical record     obtained: No    Chief complaint:  "I fell and hurt my left hip."    HPI: 86 y/o F PMHx of Afib (not on AC, unclear history), HTN (not currently on medications), Breast Ca s/p partial right mastectomy, frequent falls with b/l proximal humerus fractures who presents to the ED after a fall with left hip pain. Patient states that she usually uses her walker, but she tripped when trying to reach her walker and ambulate in the kitchen. Patient states she fell on her left side hitting her hip, arm, and back of her head, but denies any LOC. Patient found to have a left IT fracture, will be undergoing ORIF with Ortho 10/15 or 10/16. Patient states that other than hip pain she is feeling well, denies any CP, SOB, BAEZ, orthopnea, PND, LE edema, syncope, near syncope, or dizziness. Patient states she hasn't been ambulating much, METs <4, but doesn't have any cardiac symptoms. Patient with remote hx of Afib, EKG NSR, states she sees her PMD monthly for checks, no issues. Patient denies fevers, chills, numbness, tingling, urinary or fecal incontinence, N/V/D, headache, vision changes.     REVIEW OF SYMPTOMS: Cardiovascular:     chest pain,  dyspnea,  syncope,    palpitaitons,      dizziness,    Orthopnea,      Paroxsymal nocturnal dyspnea;   Respiratory:    Dyspnea,   cough,   ;   Genitourinary:  No dysuria, no hematuria;   Gastrointestinal:   No dark color stool, no melena, no diarrhea, no constipation, no abdominal pain;   Neurological: No headache, no dizziness, no slurred speech;    Psychiatric: No agitation, no anxiety.    ALL OTHER REVIEW OF SYSTEMS ARE NEGATIVE.    MEDICATIONS  (STANDING):  ceFAZolin   IVPB 2000 milliGRAM(s) IV Intermittent once  enoxaparin Injectable 40 milliGRAM(s) SubCutaneous once  lactated ringers. 1000 milliLiter(s) (80 mL/Hr) IV Continuous <Continuous>  pantoprazole    Tablet 40 milliGRAM(s) Oral before breakfast  venlafaxine XR. 37.5 milliGRAM(s) Oral daily    MEDICATIONS  (PRN):  ALPRAZolam 0.5 milliGRAM(s) Oral every 6 hours PRN anxiety  morphine  - Injectable 4 milliGRAM(s) IV Push every 6 hours PRN Severe Pain (7 - 10)  ondansetron Injectable 4 milliGRAM(s) IV Push every 6 hours PRN Nausea  oxyCODONE    IR 5 milliGRAM(s) Oral every 4 hours PRN Mild Pain (1 - 3)  oxyCODONE    IR 10 milliGRAM(s) Oral every 4 hours PRN Moderate Pain (4 - 6)     Home Medications:  Effexor XR 37.5 mg oral capsule, extended release: 1 cap(s) orally once a day (15 Oct 2018 05:03)  Protonix 40 mg oral delayed release tablet: orally 2 times a day (15 Oct 2018 05:03)  Vitamin D3 1000 intl units oral capsule: 1 cap(s) orally once a day (15 Oct 2018 05:03)  Xanax 0.5 mg oral tablet:  (15 Oct 2018 05:03)  Ibuprofen 800mg TID  Xalatan eye drops qhs    PAST MEDICAL & SURGICAL HISTORY:  Closed fracture of lower extremity, unspecified laterality, initial encounter  Fall, initial encounter  Breast cancer  Hypertension  Atrial fibrillation  S/P partial mastectomy, right      FAMILY HISTORY:  No family history of disorders      SOCIAL HISTORY:    CIGARETTES:   Former, quit 15 years ago, smoked 10 mos    ALCOHOL: Rarely    DRUGS: Denies    Vital Signs Last 24 Hrs  T(C): 36.8 (14 Oct 2018 23:33), Max: 36.8 (14 Oct 2018 23:33)  T(F): 98.2 (14 Oct 2018 23:33), Max: 98.2 (14 Oct 2018 23:33)  HR: 68 (15 Oct 2018 04:15) (68 - 69)  BP: 118/72 (15 Oct 2018 04:15) (118/72 - 127/74)  RR: 18 (15 Oct 2018 04:15) (18 - 18)  SpO2: 99% (15 Oct 2018 04:15) (97% - 99%)      PHYSICAL EXAM:  Appearance: Normal, well nourished	  HEENT:  PERRL, EOMI, sclera non-icteric	  Lymphatic: No cervical lymphadenopathy  Cardiovascular: Normal S1 S2, No JVD, III/VI systolic murmur RSB, No carotid bruits, No peripheral edema  Respiratory: Lungs clear to auscultation anteriorly  Psychiatry: A & O x 3, Mood & affect appropriate  Gastrointestinal:  Soft, Non-tender, + BS, no bruits	  Skin: No rashes, No ecchymoses, No cyanosis  Neurologic: Grossly non-focal with full strength in all four extremities  Extremities: Normal range of motion, No clubbing, cyanosis or edema  Vascular: Peripheral pulses palpable 1+ bilaterally    INTERPRETATION OF TELEMETRY: Not on telemetry    ECG: NSR, low voltage in limb leads    I&O's Detail      LABS:                        11.1   11.2  )-----------( 361      ( 15 Oct 2018 03:12 )             34.2     10-15    137  |  102  |  25.0<H>  ----------------------------<  118<H>  4.2   |  21.0<L>  |  0.53    Ca    9.5      15 Oct 2018 03:12    TPro  7.9  /  Alb  4.0  /  TBili  0.2<L>  /  DBili  x   /  AST  27  /  ALT  13  /  AlkPhos  73  10-15        PT/INR - ( 15 Oct 2018 03:12 )   PT: 11.0 sec;   INR: 1.00 ratio          RADIOLOGY & ADDITIONAL STUDIES:  X-ray:    < from: Xray Chest 1 View- PORTABLE-Urgent (10.15.18 @ 06:24) >   EXAM:  XR CHEST PORTABLE URGENT 1V                          PROCEDURE DATE:  10/15/2018          INTERPRETATION:  CHEST AP PORTABLE:    History: pre-op.     Date and time of exam: 10/15/2018 3:54 AM.    Technique: A single AP view of the chest wasobtained.    Comparison exam: 6/28/2015.    Findings:  Cardiomegaly. No hilar or mediastinal abnormality. Small left pleural   effusion, unchanged. Large hiatus hernia. No evidence of pneumothorax..    Impression:  Cardiomegaly, large hiatus hernia. Small left pleural effusion..      COURTNEY BIANCHI M.D., ATTENDING RADIOLOGIST  This document has been electronically signed. Oct 15 2018  9:29AM              < end of copied text >

## 2018-10-15 NOTE — PROGRESS NOTE ADULT - ASSESSMENT
Pt is an 86 y/o F pt with a pmhx of HTN, a-fib and breast CA, b/l prox humerus fxs, frequent falls as per EMR review and PSHx of partial R mastectomy who presents to the ED c/o L hip pain with inability to ambulate after having a fall while at home. Pt ambulates with walker and tripped attempting to reach it to ambulate away from sink where she was washing dishes. Pt had similar episode in 2016 requiring sutures for head laceration and prior to that for which she sustained b/l humeral fractures. Pt did hit head but denies losing consciousness and any preceding symptoms including palpitations, sob, chest pain, HA, visual changes, numbness, tingling, tongue biting, urinary or fecal incontinence.

## 2018-10-15 NOTE — PROGRESS NOTE ADULT - SUBJECTIVE AND OBJECTIVE BOX
Ortho Preop Note    ADRIÁN MARTE    [id number]    Patient is a 85y old  Female who presents with a chief complaint of fall with left leg pain (15 Oct 2018 04:28). She is pending clearance for her left hip Fx.      Diagnosis: left IT hip Fx    Procedure: IM nail    Surgeon: Dyan                          11.1   11.2  )-----------( 361      ( 15 Oct 2018 03:12 )             34.2     10-15    137  |  102  |  25.0<H>  ----------------------------<  118<H>  4.2   |  21.0<L>  |  0.53    Ca    9.5      15 Oct 2018 03:12    TPro  7.9  /  Alb  4.0  /  TBili  0.2<L>  /  DBili  x   /  AST  27  /  ALT  13  /  AlkPhos  73  10-15    PT/INR - ( 15 Oct 2018 03:12 )   PT: 11.0 sec;   INR: 1.00 ratio               T(C): 36.8 (10-14-18 @ 23:33), Max: 36.8 (10-14-18 @ 23:33)  HR: 68 (10-15-18 @ 04:15) (68 - 69)  BP: 118/72 (10-15-18 @ 04:15) (118/72 - 127/74)  RR: 18 (10-15-18 @ 04:15) (18 - 18)  SpO2: 99% (10-15-18 @ 04:15) (97% - 99%)    PE:    Gen: Alert, responsive, in discomfort with movement    Skin: Warm, dry and intact. No erythema or ecchymosis or bleeding noted of the injured site.    PV: 2+ DP. No cyanosis. No calf tenderness.    Sensation: Grossly intact to light touch of the affected extremity.    Motor: + 5/5 Motor function of the affected extremity.     Assessment & Plan:    85y  Female with a left hip IT fracture pending surgical management    - For OR once cleared by medicine - tentatively Tuesday  - Bed rest  - DVTP - single dose Lovenox ordered

## 2018-10-15 NOTE — ED PROVIDER NOTE - MUSCULOSKELETAL, MLM
tenderness to lateral left ankle, left hip, and left proximal LE region, shortened and externally rotated Spine appears normal, range of motion in LLE limited secondary to pain, tenderness to Spine appears normal, range of motion in LLE limited secondary to pain, tenderness to palpation of L hip, L ankle and L proximal femur region. Spine appears normal, range of motion in LLE limited secondary to pain, tenderness to palpation of L hip, L ankle and L proximal femur region. leg is also shortented and externally rotated.

## 2018-10-15 NOTE — H&P ADULT - NSHPPHYSICALEXAM_GEN_ALL_CORE
PHYSICAL EXAM:   · CONSTITUTIONAL: pleasant affect, in no acute distress  · ENMT: Airway patent, Nasal mucosa clear. Mouth with normal mucosa.  · EYES: Clear bilaterally, pupils equal, round and reactive to light. EOMI  · CARDIAC: Normal rate, regular rhythm.  Heart sounds S1, S2.  No murmurs, rubs or gallops.  . NEURO: AAOx4, CN2-12 grossly intact without any deficit, sensation intact  · RESPIRATORY: Breath sounds clear and equal bilaterally.  · GASTROINTESTINAL: Abdomen soft, non-tender, no guarding.  · MUSCULOSKELETAL: ROM LLE limited secondary to pain, leg is shortened and externally rotated. RLE wnl. Pulses intact. sensation intact.   · SKIN: Skin normal color for race, warm, dry and intact. No lacerations appreciated.

## 2018-10-15 NOTE — ED PROVIDER NOTE - CHPI ED SYMPTOMS NEG
no fever/no numbness/no vomiting/no weakness no numbness/no loss of consciousness/no weakness/no fever/no vomiting no loss of consciousness/no numbness/no tingling/chest pain, shortness of breath

## 2018-10-15 NOTE — ED ADULT NURSE NOTE - OBJECTIVE STATEMENT
Pt presents s/p mechanical fall, patients left leg is externally rotated, c/o pain at hip and left arm.  Patient is A&Ox3, hard of hearing.  Pt's family states ems said that she "may have hit her head".  Denies LOC, well appearing. hx of right mastectomy, pink band in place. denies chest pain/sob. Pt presents s/p mechanical fall, patients left leg is externally rotated, c/o pain at hip and left arm.  Patient is A&Ox3, hard of hearing.  Pt's family states ems said that she "may have hit her head".  Denies LOC, well appearing. hx of right mastectomy, pink band in place. denies chest pain/sob. Pulses present distal to extremity, able to move toes and + sensation.

## 2018-10-15 NOTE — H&P ADULT - ATTENDING COMMENTS
30 min time spent discussing advanced care planning including code status, existence of health care proxy, plan of treatment, consultants if called, and prognosis. Family and pt in agreement with above, all questions answered and concerns addressed.      FULL CODE  agrees to ortho consultation and plan for surgical intervention  agrees to EKG for optimization

## 2018-10-15 NOTE — PROGRESS NOTE ADULT - PROBLEM SELECTOR PLAN 1
- pain control with percocet and morphine  - cardiac clearance pending  - for OR 10/16/18 - pain control with percocet and morphine  - pt medically cleared for OR  - for OR 10/16/18

## 2018-10-15 NOTE — PROGRESS NOTE ADULT - ATTENDING COMMENTS
Ortho Trauma Attending:  Agree with above PA note.  Note edited where necessary.    plan for OR 10/16/18 pending medical optimization.    Juan Zaidi MD  Orthopaedic Trauma Surgery

## 2018-10-15 NOTE — H&P ADULT - PROBLEM SELECTOR PLAN 1
admit to medicine  pain control with percocet and morphine  pt post op for evaluation-likely SHANTE  hold chemical DVT prophylaxis for OR  EKG, cbc, cmp, coags, troponin, chest x ray for optimization-xray performed awaiting results, EKG ordered, trop ordered-labs reviewed

## 2018-10-15 NOTE — ED PROVIDER NOTE - CONSTITUTIONAL, MLM
normal... Well appearing, well nourished, awake, alert, oriented to situation and in no apparent distress. Well appearing, well nourished, awake, alert, oriented to person and situation and in no apparent distress.

## 2018-10-16 ENCOUNTER — TRANSCRIPTION ENCOUNTER (OUTPATIENT)
Age: 83
End: 2018-10-16

## 2018-10-16 DIAGNOSIS — I48.91 UNSPECIFIED ATRIAL FIBRILLATION: ICD-10-CM

## 2018-10-16 DIAGNOSIS — I10 ESSENTIAL (PRIMARY) HYPERTENSION: ICD-10-CM

## 2018-10-16 DIAGNOSIS — N39.0 URINARY TRACT INFECTION, SITE NOT SPECIFIED: ICD-10-CM

## 2018-10-16 LAB
ANION GAP SERPL CALC-SCNC: 14 MMOL/L — SIGNIFICANT CHANGE UP (ref 5–17)
ANION GAP SERPL CALC-SCNC: 15 MMOL/L — SIGNIFICANT CHANGE UP (ref 5–17)
APPEARANCE UR: CLEAR — SIGNIFICANT CHANGE UP
BACTERIA # UR AUTO: ABNORMAL
BASOPHILS # BLD AUTO: 0 K/UL — SIGNIFICANT CHANGE UP (ref 0–0.2)
BASOPHILS NFR BLD AUTO: 0.2 % — SIGNIFICANT CHANGE UP (ref 0–2)
BILIRUB UR-MCNC: NEGATIVE — SIGNIFICANT CHANGE UP
BUN SERPL-MCNC: 18 MG/DL — SIGNIFICANT CHANGE UP (ref 8–20)
BUN SERPL-MCNC: 19 MG/DL — SIGNIFICANT CHANGE UP (ref 8–20)
CALCIUM SERPL-MCNC: 8.9 MG/DL — SIGNIFICANT CHANGE UP (ref 8.6–10.2)
CALCIUM SERPL-MCNC: 9 MG/DL — SIGNIFICANT CHANGE UP (ref 8.6–10.2)
CHLORIDE SERPL-SCNC: 98 MMOL/L — SIGNIFICANT CHANGE UP (ref 98–107)
CHLORIDE SERPL-SCNC: 99 MMOL/L — SIGNIFICANT CHANGE UP (ref 98–107)
CO2 SERPL-SCNC: 21 MMOL/L — LOW (ref 22–29)
CO2 SERPL-SCNC: 22 MMOL/L — SIGNIFICANT CHANGE UP (ref 22–29)
COLOR SPEC: YELLOW — SIGNIFICANT CHANGE UP
CREAT SERPL-MCNC: 0.47 MG/DL — LOW (ref 0.5–1.3)
CREAT SERPL-MCNC: 0.6 MG/DL — SIGNIFICANT CHANGE UP (ref 0.5–1.3)
DIFF PNL FLD: ABNORMAL
EOSINOPHIL # BLD AUTO: 0 K/UL — SIGNIFICANT CHANGE UP (ref 0–0.5)
EOSINOPHIL NFR BLD AUTO: 0.3 % — SIGNIFICANT CHANGE UP (ref 0–6)
EPI CELLS # UR: SIGNIFICANT CHANGE UP
GLUCOSE SERPL-MCNC: 183 MG/DL — HIGH (ref 70–115)
GLUCOSE SERPL-MCNC: 98 MG/DL — SIGNIFICANT CHANGE UP (ref 70–115)
GLUCOSE UR QL: NEGATIVE MG/DL — SIGNIFICANT CHANGE UP
HCT VFR BLD CALC: 31.4 % — LOW (ref 37–47)
HCT VFR BLD CALC: 35.4 % — LOW (ref 37–47)
HGB BLD-MCNC: 10.3 G/DL — LOW (ref 12–16)
HGB BLD-MCNC: 11.8 G/DL — LOW (ref 12–16)
KETONES UR-MCNC: NEGATIVE — SIGNIFICANT CHANGE UP
LEUKOCYTE ESTERASE UR-ACNC: ABNORMAL
LYMPHOCYTES # BLD AUTO: 1.5 K/UL — SIGNIFICANT CHANGE UP (ref 1–4.8)
LYMPHOCYTES # BLD AUTO: 12.1 % — LOW (ref 20–55)
MAGNESIUM SERPL-MCNC: 1.9 MG/DL — SIGNIFICANT CHANGE UP (ref 1.6–2.6)
MCHC RBC-ENTMCNC: 29.9 PG — SIGNIFICANT CHANGE UP (ref 27–31)
MCHC RBC-ENTMCNC: 30.1 PG — SIGNIFICANT CHANGE UP (ref 27–31)
MCHC RBC-ENTMCNC: 32.8 G/DL — SIGNIFICANT CHANGE UP (ref 32–36)
MCHC RBC-ENTMCNC: 33.3 G/DL — SIGNIFICANT CHANGE UP (ref 32–36)
MCV RBC AUTO: 90.3 FL — SIGNIFICANT CHANGE UP (ref 81–99)
MCV RBC AUTO: 91.3 FL — SIGNIFICANT CHANGE UP (ref 81–99)
MONOCYTES # BLD AUTO: 0.4 K/UL — SIGNIFICANT CHANGE UP (ref 0–0.8)
MONOCYTES NFR BLD AUTO: 3.7 % — SIGNIFICANT CHANGE UP (ref 3–10)
NEUTROPHILS # BLD AUTO: 10.1 K/UL — HIGH (ref 1.8–8)
NEUTROPHILS NFR BLD AUTO: 83.2 % — HIGH (ref 37–73)
NITRITE UR-MCNC: POSITIVE
PH UR: 7 — SIGNIFICANT CHANGE UP (ref 5–8)
PLATELET # BLD AUTO: 311 K/UL — SIGNIFICANT CHANGE UP (ref 150–400)
PLATELET # BLD AUTO: 357 K/UL — SIGNIFICANT CHANGE UP (ref 150–400)
POTASSIUM SERPL-MCNC: 3.4 MMOL/L — LOW (ref 3.5–5.3)
POTASSIUM SERPL-MCNC: 3.8 MMOL/L — SIGNIFICANT CHANGE UP (ref 3.5–5.3)
POTASSIUM SERPL-SCNC: 3.4 MMOL/L — LOW (ref 3.5–5.3)
POTASSIUM SERPL-SCNC: 3.8 MMOL/L — SIGNIFICANT CHANGE UP (ref 3.5–5.3)
PROT UR-MCNC: 30 MG/DL
RBC # BLD: 3.44 M/UL — LOW (ref 4.4–5.2)
RBC # BLD: 3.92 M/UL — LOW (ref 4.4–5.2)
RBC # FLD: 14.1 % — SIGNIFICANT CHANGE UP (ref 11–15.6)
RBC # FLD: 14.2 % — SIGNIFICANT CHANGE UP (ref 11–15.6)
RBC CASTS # UR COMP ASSIST: ABNORMAL /HPF (ref 0–4)
SODIUM SERPL-SCNC: 134 MMOL/L — LOW (ref 135–145)
SODIUM SERPL-SCNC: 135 MMOL/L — SIGNIFICANT CHANGE UP (ref 135–145)
SP GR SPEC: 1.01 — SIGNIFICANT CHANGE UP (ref 1.01–1.02)
UROBILINOGEN FLD QL: NEGATIVE MG/DL — SIGNIFICANT CHANGE UP
WBC # BLD: 12.2 K/UL — HIGH (ref 4.8–10.8)
WBC # BLD: 9.1 K/UL — SIGNIFICANT CHANGE UP (ref 4.8–10.8)
WBC # FLD AUTO: 12.2 K/UL — HIGH (ref 4.8–10.8)
WBC # FLD AUTO: 9.1 K/UL — SIGNIFICANT CHANGE UP (ref 4.8–10.8)
WBC UR QL: >50

## 2018-10-16 PROCEDURE — 99233 SBSQ HOSP IP/OBS HIGH 50: CPT

## 2018-10-16 PROCEDURE — 27245 TREAT THIGH FRACTURE: CPT | Mod: AS,LT

## 2018-10-16 PROCEDURE — 27245 TREAT THIGH FRACTURE: CPT | Mod: LT

## 2018-10-16 PROCEDURE — 72170 X-RAY EXAM OF PELVIS: CPT | Mod: 26

## 2018-10-16 RX ORDER — ACETAMINOPHEN 500 MG
750 TABLET ORAL ONCE
Qty: 0 | Refills: 0 | Status: DISCONTINUED | OUTPATIENT
Start: 2018-10-16 | End: 2018-10-18

## 2018-10-16 RX ORDER — CEFAZOLIN SODIUM 1 G
2000 VIAL (EA) INJECTION EVERY 8 HOURS
Qty: 0 | Refills: 0 | Status: COMPLETED | OUTPATIENT
Start: 2018-10-16 | End: 2018-10-16

## 2018-10-16 RX ORDER — CEFTRIAXONE 500 MG/1
INJECTION, POWDER, FOR SOLUTION INTRAMUSCULAR; INTRAVENOUS
Qty: 0 | Refills: 0 | Status: DISCONTINUED | OUTPATIENT
Start: 2018-10-16 | End: 2018-10-16

## 2018-10-16 RX ORDER — ACETAMINOPHEN 500 MG
975 TABLET ORAL EVERY 8 HOURS
Qty: 0 | Refills: 0 | Status: DISCONTINUED | OUTPATIENT
Start: 2018-10-16 | End: 2018-10-18

## 2018-10-16 RX ORDER — ONDANSETRON 8 MG/1
4 TABLET, FILM COATED ORAL ONCE
Qty: 0 | Refills: 0 | Status: DISCONTINUED | OUTPATIENT
Start: 2018-10-16 | End: 2018-10-16

## 2018-10-16 RX ORDER — VENLAFAXINE HCL 75 MG
37.5 CAPSULE, EXT RELEASE 24 HR ORAL DAILY
Qty: 0 | Refills: 0 | Status: DISCONTINUED | OUTPATIENT
Start: 2018-10-16 | End: 2018-10-18

## 2018-10-16 RX ORDER — ENOXAPARIN SODIUM 100 MG/ML
40 INJECTION SUBCUTANEOUS EVERY 24 HOURS
Qty: 0 | Refills: 0 | Status: DISCONTINUED | OUTPATIENT
Start: 2018-10-17 | End: 2018-10-18

## 2018-10-16 RX ORDER — FERROUS SULFATE 325(65) MG
325 TABLET ORAL
Qty: 0 | Refills: 0 | Status: DISCONTINUED | OUTPATIENT
Start: 2018-10-16 | End: 2018-10-18

## 2018-10-16 RX ORDER — CEFTRIAXONE 500 MG/1
1 INJECTION, POWDER, FOR SOLUTION INTRAMUSCULAR; INTRAVENOUS EVERY 24 HOURS
Qty: 0 | Refills: 0 | Status: DISCONTINUED | OUTPATIENT
Start: 2018-10-17 | End: 2018-10-18

## 2018-10-16 RX ORDER — OXYCODONE HYDROCHLORIDE 5 MG/1
5 TABLET ORAL EVERY 4 HOURS
Qty: 0 | Refills: 0 | Status: DISCONTINUED | OUTPATIENT
Start: 2018-10-16 | End: 2018-10-18

## 2018-10-16 RX ORDER — FENTANYL CITRATE 50 UG/ML
25 INJECTION INTRAVENOUS
Qty: 0 | Refills: 0 | Status: DISCONTINUED | OUTPATIENT
Start: 2018-10-16 | End: 2018-10-16

## 2018-10-16 RX ORDER — SODIUM CHLORIDE 9 MG/ML
1000 INJECTION INTRAMUSCULAR; INTRAVENOUS; SUBCUTANEOUS
Qty: 0 | Refills: 0 | Status: DISCONTINUED | OUTPATIENT
Start: 2018-10-16 | End: 2018-10-17

## 2018-10-16 RX ORDER — PANTOPRAZOLE SODIUM 20 MG/1
40 TABLET, DELAYED RELEASE ORAL
Qty: 0 | Refills: 0 | Status: DISCONTINUED | OUTPATIENT
Start: 2018-10-16 | End: 2018-10-18

## 2018-10-16 RX ORDER — SODIUM CHLORIDE 9 MG/ML
1000 INJECTION, SOLUTION INTRAVENOUS
Qty: 0 | Refills: 0 | Status: DISCONTINUED | OUTPATIENT
Start: 2018-10-16 | End: 2018-10-16

## 2018-10-16 RX ORDER — ASCORBIC ACID 60 MG
500 TABLET,CHEWABLE ORAL
Qty: 0 | Refills: 0 | Status: DISCONTINUED | OUTPATIENT
Start: 2018-10-16 | End: 2018-10-18

## 2018-10-16 RX ORDER — ACETAMINOPHEN 500 MG
1000 TABLET ORAL ONCE
Qty: 0 | Refills: 0 | Status: DISCONTINUED | OUTPATIENT
Start: 2018-10-16 | End: 2018-10-16

## 2018-10-16 RX ORDER — OXYCODONE HYDROCHLORIDE 5 MG/1
10 TABLET ORAL EVERY 4 HOURS
Qty: 0 | Refills: 0 | Status: DISCONTINUED | OUTPATIENT
Start: 2018-10-16 | End: 2018-10-18

## 2018-10-16 RX ORDER — DOCUSATE SODIUM 100 MG
100 CAPSULE ORAL THREE TIMES A DAY
Qty: 0 | Refills: 0 | Status: DISCONTINUED | OUTPATIENT
Start: 2018-10-16 | End: 2018-10-18

## 2018-10-16 RX ORDER — ONDANSETRON 8 MG/1
4 TABLET, FILM COATED ORAL EVERY 6 HOURS
Qty: 0 | Refills: 0 | Status: DISCONTINUED | OUTPATIENT
Start: 2018-10-16 | End: 2018-10-18

## 2018-10-16 RX ORDER — ACETAMINOPHEN 500 MG
650 TABLET ORAL EVERY 6 HOURS
Qty: 0 | Refills: 0 | Status: DISCONTINUED | OUTPATIENT
Start: 2018-10-16 | End: 2018-10-18

## 2018-10-16 RX ORDER — ALPRAZOLAM 0.25 MG
0.5 TABLET ORAL EVERY 6 HOURS
Qty: 0 | Refills: 0 | Status: DISCONTINUED | OUTPATIENT
Start: 2018-10-16 | End: 2018-10-18

## 2018-10-16 RX ORDER — FOLIC ACID 0.8 MG
1 TABLET ORAL DAILY
Qty: 0 | Refills: 0 | Status: DISCONTINUED | OUTPATIENT
Start: 2018-10-16 | End: 2018-10-18

## 2018-10-16 RX ADMIN — Medication 37.5 MILLIGRAM(S): at 13:22

## 2018-10-16 RX ADMIN — Medication 325 MILLIGRAM(S): at 17:01

## 2018-10-16 RX ADMIN — SODIUM CHLORIDE 75 MILLILITER(S): 9 INJECTION INTRAMUSCULAR; INTRAVENOUS; SUBCUTANEOUS at 20:56

## 2018-10-16 RX ADMIN — Medication 100 MILLIGRAM(S): at 17:00

## 2018-10-16 RX ADMIN — Medication 100 MILLIGRAM(S): at 23:14

## 2018-10-16 RX ADMIN — Medication 1 MILLIGRAM(S): at 11:44

## 2018-10-16 RX ADMIN — Medication 100 MILLIGRAM(S): at 20:56

## 2018-10-16 RX ADMIN — Medication 500 MILLIGRAM(S): at 17:01

## 2018-10-16 RX ADMIN — Medication 325 MILLIGRAM(S): at 11:44

## 2018-10-16 RX ADMIN — Medication 100 MILLIGRAM(S): at 13:22

## 2018-10-16 RX ADMIN — Medication 1 TABLET(S): at 11:44

## 2018-10-16 NOTE — DISCHARGE NOTE ADULT - ADDITIONAL INSTRUCTIONS
ORTHOPEDIC FOLLOW UP RECOMMENDATIONS: The patient will be seen in the office between 2-3 weeks for wound check. Sutures/Staples/Tape will be removed at that time. Patient may shower after post-op day #5. The dressing is to be removed on post op day #9 (10/25/18). IF THE DRESSING BECOMES SOILED BEFORE THE REMOVAL DATE, CHANGE WITH A SIMILAR DRESSING. IF THE DRESSING BECOMES STAINED WITH DISCHARGE, CONTACT THE OFFICE FOR FURTHER DIRECTIONS.  The patient will contact the office if the wound becomes red, has increasing pain, develops bleeding or discharge, an injury occurs, or has other concerns. The patient will continue PT for gait training. The patient will continue LOVENOX for 4 weeks for blood clot prevention. The patient will take OXYCODONE AND TYLENOL for pain control and titrate according to prescription and patient needs. The patient will take Colace while taking oxycodone to prevent narcotic associated constipation.  Additionally, increase water intake (drink at least 8 glasses of water daily) and try adding fiber to the diet by eating fruits, vegetables and foods that are rich in grains. If constipation is experienced, contact the medical/primary care provider to discuss further treatment options. The patient is FULL weight bearing.

## 2018-10-16 NOTE — DISCHARGE NOTE ADULT - PLAN OF CARE
s/p repair follow up with orth in 1-2 weeks  lovenox SQ daily for dvt ppx for 30 days post op off anticoagulation likely due to fall risk

## 2018-10-16 NOTE — BRIEF OPERATIVE NOTE - PRE-OP DX
Closed displaced intertrochanteric fracture of left femur, initial encounter  10/15/2018    Active  Juan Zaidi

## 2018-10-16 NOTE — DISCHARGE NOTE ADULT - MEDICATION SUMMARY - MEDICATIONS TO TAKE
I will START or STAY ON the medications listed below when I get home from the hospital:    oxyCODONE 5 mg oral tablet  -- 1 tab(s) by mouth every 4 hours, As needed, Moderate Pain (4 - 6)  -- Indication: For pain    enoxaparin  -- 40 milligram(s) subcutaneous once a day until 11/17/18 for dvt ppx s/p ORIF  -- Indication: For dvt ppx    Effexor XR 37.5 mg oral capsule, extended release  -- 1 cap(s) by mouth once a day  -- Indication: For depression    Xanax 0.5 mg oral tablet  -- Indication: For Anxiety     cefpodoxime 200 mg oral tablet  -- 1 tab(s) by mouth every 12 hours until 10/24/18 fir Ecoli UTI  -- Indication: For Ecoli UTI    FeroSul 325 mg (65 mg elemental iron) oral tablet  -- 1 tab(s) by mouth once a day   -- Indication: For Anemia    docusate sodium 100 mg oral capsule  -- 1 cap(s) by mouth 3 times a day  -- Indication: For Constipation    dorzolamide 2% ophthalmic solution  -- 1 drop(s) to each affected eye   -- Indication: For eye    latanoprost 0.005% ophthalmic solution  -- 1 drop(s) to each affected eye once a day (at bedtime)  -- Indication: For eye    Protonix 40 mg oral delayed release tablet  -- orally 2 times a day  -- Indication: For gerd    Multiple Vitamins oral tablet  -- 1 tab(s) by mouth once a day  -- Indication: For vitamin    ascorbic acid 500 mg oral tablet  -- 1 tab(s) by mouth 2 times a day  -- Indication: For vitamin    folic acid 1 mg oral tablet  -- 1 tab(s) by mouth once a day  -- Indication: For Folic acid    Vitamin D3 1000 intl units oral capsule  -- 1 cap(s) by mouth once a day  -- Indication: For vitamin

## 2018-10-16 NOTE — PROGRESS NOTE ADULT - ASSESSMENT
A/P: : 84 y/o F PMHx of Afib (not on AC, unclear history), HTN (not currently on medications), Breast Ca s/p partial right mastectomy, frequent falls with b/l proximal humerus fractures who presents to the ED after a fall with left hip pain found to have left IT fracture. EKG with NSR, no acute changes. CXR with cardiomegaly, small left pleural effusion. Trop pending. Echo pending.     1. Mercedes-operative Cardiac Risk Stratification   tolerated surgery well. No cardiac complication.     2. Afib  resume home meds after surgery  long term anticoagulation as per primary cardiologist   3) Deep venous thrombosis prophylaxis: heparin or lovenox SQ.

## 2018-10-16 NOTE — PROGRESS NOTE ADULT - ASSESSMENT
85 yr old female with hypertension, atrial fibrillation, h/o breast ca admitted after a mechanical fall, noted to have a left intertrochanteric hip fracture. Evaluated by orthopedics, intermedullary nail fixation was done on 10/16. She was also noted to have a UTI, hence started on Ceftriaxone, cultures were sent. PT was consulted.

## 2018-10-16 NOTE — BRIEF OPERATIVE NOTE - PROCEDURE
<<-----Click on this checkbox to enter Procedure Intramedullary nail fixation of left femur  10/16/2018    Active  TMULRY

## 2018-10-16 NOTE — PROGRESS NOTE ADULT - SUBJECTIVE AND OBJECTIVE BOX
Orthopedic PA Postop Note  Patient S/P LEFT HIP IM NAIL  Patient in bed comfortable   LEFT Leg  Dressing C/D/I  DP Pulse intact   Calf Soft NT  Dorsi/Plantar Flexion/EHL/FHL intact   Sensation intact to light touch    Vital Signs Last 24 Hrs  T(C): 36.6 (16 Oct 2018 16:04), Max: 37.6 (16 Oct 2018 05:16)  T(F): 97.8 (16 Oct 2018 16:04), Max: 99.7 (16 Oct 2018 05:16)  HR: 91 (16 Oct 2018 16:04) (69 - 95)  BP: 104/59 (16 Oct 2018 16:04) (104/59 - 133/53)  BP(mean): --  RR: 18 (16 Oct 2018 16:04) (14 - 18)  SpO2: 96% (16 Oct 2018 16:04) (93% - 100%)    < from: Xray Pelvis AP only (10.16.18 @ 11:21) >   EXAM:  PELVIS                          PROCEDURE DATE:  10/16/2018          INTERPRETATION:  TECHNIQUE:  2 views pelvis     COMPARISON: 6/28/2015    CLINICAL HISTORY: Postop left hip nail.     FINDINGS:    Patient is status post left hip 2 screw and intramedullary nail fixation.   Osteopenia. Mild degenerative changes. Moderate lateral thigh soft tissue   swelling. Old healed left inferior pubic ramus fracture deformity.   Hardware is in satisfactory position.    IMPRESSION: No acute cardiopulmonary disease process.                ANTONELLA FIERRO M.D., ATTENDING RADIOLOGIST  This document has been electronically signed. Oct 16 2018  2:51PM    < end of copied text >      A/P: 85F S/P LEFT HIP IM NAIL  1. DVTP - LOVENOX  2. Physical Therapy   3. Pain Control as clinically indicated

## 2018-10-16 NOTE — DISCHARGE NOTE ADULT - CARE PROVIDERS DIRECT ADDRESSES
[email or web address]

## 2018-10-16 NOTE — DISCHARGE NOTE ADULT - OTHER SIGNIFICANT FINDINGS
2Decho:  PHYSICIAN INTERPRETATION:  Left Ventricle: The left ventricular internal cavity size is normal. Left   ventricular wall thickness is normal.  Global LV systolic function was normal. Left ventricular ejection   fraction, by visual estimation, is 65 to 70%. Spectral Doppler shows   impaired relaxation pattern of left ventricular myocardial filling (Grade   I diastolic dysfunction). Elevated left atrial and left ventricular   end-diastolic pressures.  LV Wall Scoring:  The basal inferior segment is akinetic.    Right Ventricle: Normal right ventricular size and function. TV S' 0.2   m/s.  Left Atrium: Severely enlarged left atrium.  Right Atrium: The right atrium was not well visualized.  Pericardium: There is no evidence of pericardial effusion.  Mitral Valve: Posterior leaflet of mitral valve is thickened. There is   mild posterior mitral valve prolapse, mild mitral valve regurgitaiton.  Tricuspid Valve: The tricuspid valve is not well seen. Trivial tricuspid   regurgitation is visualized. Estimated pulmonary artery systolic pressure   is 35.6 mmHg assuming a right atrial pressure of 10 mmHg, which is   consistent with borderline pulmonary hypertension.  Aortic Valve: The aortic valve is trileaflet. Sclerotic aortic valve with   decreased opening. Peak Av velocity is 1.52 m/s, mean transaortic   gradient equals 4.6 mmHg. Mild aortic valve regurgitation is seen.  Pulmonic Valve: The pulmonic valve was not well visualized. Mild pulmonic   valve regurgitation.  Aorta: The aortic root is normal in size and structure.  Pulmonary Artery: The pulmonary artery is not well seen.  Venous: The pulmonary veins were not well visualized. The inferior vena   cava is not well visualized.  In comparison to the previous echocardiogram(s): There are no prior   studies on this patient for comparison purposes.       Summary:   1. Technically difficult study.   2. Normal global left ventricular systolic function.   3. Left ventricular ejection fraction, by visual estimation, is 65 to   70%.   4. Basal inferior segment is abnormal as described above.   5. Spectral Doppler shows impaired relaxation pattern of left   ventricular myocardial filling (Grade I diastolic dysfunction).   6. Posterior leaflet of mitral valve is thickened. There is mild   posterior mitral valve prolapse, mild mitral valve regurgitaiton.   7. Elevated left atrial and left ventricular end-diastolic pressures.   8. Severely enlarged left atrium.   9. Mild aortic regurgitation.  10. Sclerotic aortic valve with decreased opening.No significant aortic   valve stenosis.  11. Trace tricuspid regurgitation.  12. Estimated pulmonary artery systolic pressure is 35.6 mmHg assuming a   right atrial pressure of 10 mmHg, which is consistent with borderline   pulmonary hypertension.  13. There is no evidence of pericardial effusion.  14. Incidental finding of gallstones.    [id number] Levi Calvert MD, Electronically signed on 10/15/2018 at 11:37:22   AM

## 2018-10-16 NOTE — PROGRESS NOTE ADULT - SUBJECTIVE AND OBJECTIVE BOX
[facility]                                                        Office: [address] [address]                                                       Telephone: [phone]. Fax:[phone]                                                                             PROGRESS NOTE   Reason for follow up:  pre-operative cardiovascular risk evaluation and management                             Overnight: No new events.   Update: got left hip surgery.  tolrated surgery well. no cardiac complication.     Subjective: "  i need to sleep"   Complains of:    complain of weakness.   Review of symptoms: Cardiac:  No chest pain. No dyspnea. No palpitations.  Respiratory: no cough. No dyspnea  Gastrointestinal: No diarrhea. No abdominal pain. No bleeding.     Past medical history: No updates.   Chronic conditions:  Hypertension: controlled.   	  Vitals:  T(C): 36.6 (10-16-18 @ 16:04), Max: 37.6 (10-16-18 @ 05:16)  HR: 91 (10-16-18 @ 16:04) (69 - 95)  BP: 104/59 (10-16-18 @ 16:04) (104/59 - 133/53)  RR: 18 (10-16-18 @ 16:04) (14 - 18)  SpO2: 96% (10-16-18 @ 16:04) (93% - 100%)  Wt(kg): --  I&O's Summary    15 Oct 2018 07:01  -  16 Oct 2018 07:00  --------------------------------------------------------  IN: 1040 mL / OUT: 0 mL / NET: 1040 mL    16 Oct 2018 07:01  -  16 Oct 2018 18:37  --------------------------------------------------------  IN: 1 mL / OUT: 120 mL / NET: -119 mL      Weight (kg): 45.4 (10-14 @ 23:33)    PHYSICAL EXAM:  Appearance: Comfortable. No acute distress  HEENT:  Head and neck: Atraumatic. Normocephalic.  Normal oral mucosa, PERRL, Neck is supple. No JVD, No carotid bruit.   Neurologic: A & O x 3, no focal deficits. EOMI , Cranial nerves are intact.  Lymphatic: No cervical lymphadenopathy  Cardiovascular: Normal S1 S2, No murmur, rubs/gallops. No JVD, No edema  Respiratory: Lungs clear to auscultation  Gastrointestinal:  Soft, Non-tender, + BS  Lower Extremities: No edema  Psychiatry: Patient is calm. No agitation. Mood & affect appropriate  Skin: No rashes/ ecchymoses/cyanosis/ulcers visualized on the face, hands or feet.    CURRENT MEDICATIONS:    ceFAZolin   IVPB  acetaminophen  IVPB ..  venlafaxine XR.  docusate sodium  pantoprazole    Tablet  ascorbic acid  ferrous    sulfate  folic acid  multivitamin  sodium chloride 0.9%.      LABS:	 	  CARDIAC MARKERS ( 15 Oct 2018 13:37 )  x     / <0.01 ng/mL / x     / x     / x      p-BNP 15 Oct 2018 13:37: x                                11.8   12.2  )-----------( 311      ( 16 Oct 2018 09:42 )             35.4     10-16    134<L>  |  98  |  18.0  ----------------------------<  183<H>  3.4<L>   |  21.0<L>  |  0.60    Ca    9.0      16 Oct 2018 09:42  Phos  3.4     10-15  Mg     1.9     10-16    TPro  7.9  /  Alb  3.9  /  TBili  0.4  /  DBili  x   /  AST  26  /  ALT  11  /  AlkPhos  77  10-15    proBNP:   Lipid Profile:   HgA1c: TSH:

## 2018-10-16 NOTE — DISCHARGE NOTE ADULT - HOSPITAL COURSE
Pt is an 86 y/o F pt with a pmhx of HTN, a-fib and breast CA, b/l prox humerus fxs, frequent falls as per EMR review and PSHx of partial R mastectomy who presents to the ED c/o L hip pain with inability to ambulate after having a fall while at home. Pt ambulates with walker and tripped attempting to reach it to ambulate away from sink where she was washing dishes. Pt had similar episode in 2016 requiring sutures for head laceration and prior to that for which she sustained b/l humeral fractures. Pt did hit head but denies losing consciousness and any preceding symptoms including palpitations, sob, chest pain, HA, visual changes, numbness, tingling, tongue biting, urinary or fecal incontinence.      Problem/Plan - 1:  ·  Problem: Intertrochanteric fracture of left femur, closed, initial encounter.  Plan: - s/p repair 10/16/18  - pain meds prn  - follow up with ortho 1-2 weeks.      Problem/Plan - 2:  ·  Problem: UTI (urinary tract infection). Ecoli  Plan: - abx changed to po vantin until 10/24/18       Problem/Plan - 3:  ·  Problem: Anemia.  Plan: expected blood loss from OR  - monitor  - c.w iron and folic acid.      Problem/Plan - 4:  ·  Problem: Fall, initial encounter.  Plan: - PT eval rec SHANTE  - fall precautions  - CT head negative for acute intracranial pathology.      Problem/Plan - 5:  ·  Problem: Chronic atrial fibrillation.  Plan: - pt rate controlled at this time  - not on AC at home likely due to to falls.      Problem/Plan - 6:  Problem: Need for prophylactic measure. Plan: DVT ppx: Lovenox SQ for 30 days post hip surgery    PHYSICAL EXAM:  Vital Signs Last 24 Hrs  T(C): 37.3 (17 Oct 2018 23:51), Max: 37.3 (17 Oct 2018 23:51)  T(F): 99.2 (17 Oct 2018 23:51), Max: 99.2 (17 Oct 2018 23:51)  HR: 86 (17 Oct 2018 23:51) (86 - 92)  BP: 123/65 (17 Oct 2018 23:51) (113/61 - 123/65)  BP(mean): --  RR: 18 (17 Oct 2018 23:51) (18 - 18)  SpO2: 98% (17 Oct 2018 23:51) (97% - 98%)    GENERAL: NAD, well-groomed  HEENT: PERRL, +EOMI  CHEST/LUNG: Clear to auscultation bilaterally; No wheezing  HEART: S1S2+, Regular rate and rhythm; No murmurs  ABDOMEN: Soft, Nontender, Nondistended; Bowel sounds present  EXTREMITIES:  2+ Peripheral Pulses, No clubbing, cyanosis, or edema    pt stable for SHANTE     total time spent for discharge 35 minutes

## 2018-10-16 NOTE — DISCHARGE NOTE ADULT - CARE PLAN
Principal Discharge DX:	Hip fracture Principal Discharge DX:	Hip fracture  Goal:	s/p repair  Assessment and plan of treatment:	follow up with orth in 1-2 weeks  lovenox SQ daily for dvt ppx for 30 days post op  Secondary Diagnosis:	Anemia  Secondary Diagnosis:	Atrial fibrillation  Assessment and plan of treatment:	off anticoagulation likely due to fall risk  Secondary Diagnosis:	Hypertension

## 2018-10-16 NOTE — DISCHARGE NOTE ADULT - PATIENT PORTAL LINK FT
You can access the [facility] Patient Portal, offered by [facility], by registering with the following website: [email or web address]

## 2018-10-16 NOTE — BRIEF OPERATIVE NOTE - POST-OP DX
Closed displaced intertrochanteric fracture of left femur, initial encounter  10/16/2018    Active  Daniel Steward

## 2018-10-16 NOTE — PROGRESS NOTE ADULT - SUBJECTIVE AND OBJECTIVE BOX
INTERVAL HPI/OVERNIGHT EVENTS:    CC: left hip fracture s/p intramedullary nail fixation, hypertension, atrial fibrillation, h/o breast cancer    Chart and course reviewed. Seen post OR, denies pain, mild nausea after medications.    Vital Signs Last 24 Hrs  T(C): 36.2 (16 Oct 2018 10:23), Max: 37.6 (16 Oct 2018 05:16)  T(F): 97.2 (16 Oct 2018 10:23), Max: 99.7 (16 Oct 2018 05:16)  HR: 69 (16 Oct 2018 10:23) (69 - 95)  BP: 115/56 (16 Oct 2018 10:23) (106/63 - 133/53)  BP(mean): --  RR: 16 (16 Oct 2018 10:23) (14 - 18)  SpO2: 95% (16 Oct 2018 10:23) (93% - 100%)    PHYSICAL EXAM:    GENERAL: Not in distress, alert  CHEST/LUNG: b/l air entry  HEART: Regular  ABDOMEN: Soft, BS+  EXTREMITIES:  No edema, tenderness, dressing over left hip    MEDICATIONS  (STANDING):  acetaminophen  IVPB .. 750 milliGRAM(s) IV Intermittent once  ascorbic acid 500 milliGRAM(s) Oral two times a day  ceFAZolin   IVPB 2000 milliGRAM(s) IV Intermittent every 8 hours  docusate sodium 100 milliGRAM(s) Oral three times a day  ferrous    sulfate 325 milliGRAM(s) Oral three times a day with meals  folic acid 1 milliGRAM(s) Oral daily  multivitamin 1 Tablet(s) Oral daily  pantoprazole    Tablet 40 milliGRAM(s) Oral before breakfast  sodium chloride 0.9%. 1000 milliLiter(s) (75 mL/Hr) IV Continuous <Continuous>  venlafaxine XR. 37.5 milliGRAM(s) Oral daily    MEDICATIONS  (PRN):  acetaminophen   Tablet .. 650 milliGRAM(s) Oral every 6 hours PRN Temp greater or equal to 38C (100.4F)  acetaminophen   Tablet .. 975 milliGRAM(s) Oral every 8 hours PRN Mild Pain (1 - 3)  ALPRAZolam 0.5 milliGRAM(s) Oral every 6 hours PRN anxiety  ondansetron Injectable 4 milliGRAM(s) IV Push every 6 hours PRN Nausea and/or Vomiting  oxyCODONE    IR 10 milliGRAM(s) Oral every 4 hours PRN Severe Pain (7 - 10)  oxyCODONE    IR 5 milliGRAM(s) Oral every 4 hours PRN Moderate Pain (4 - 6)      Allergies    No Known Allergies    Intolerances          LABS:                          11.8   12.2  )-----------( 311      ( 16 Oct 2018 09:42 )             35.4     10-16    134<L>  |  98  |  18.0  ----------------------------<  183<H>  3.4<L>   |  21.0<L>  |  0.60    Ca    9.0      16 Oct 2018 09:42  Phos  3.4     10-15  Mg     1.9     10-16    TPro  7.9  /  Alb  3.9  /  TBili  0.4  /  DBili  x   /  AST  26  /  ALT  11  /  AlkPhos  77  10-15    PT/INR - ( 15 Oct 2018 13:37 )   PT: 10.5 sec;   INR: 0.96 ratio         PTT - ( 15 Oct 2018 13:37 )  PTT:26.2 sec  Urinalysis Basic - ( 16 Oct 2018 01:08 )    Color: Yellow / Appearance: Clear / S.015 / pH: x  Gluc: x / Ketone: Negative  / Bili: Negative / Urobili: Negative mg/dL   Blood: x / Protein: 30 mg/dL / Nitrite: Positive   Leuk Esterase: Moderate / RBC: 25-50 /HPF / WBC >50   Sq Epi: x / Non Sq Epi: Few / Bacteria: TNTC        RADIOLOGY & ADDITIONAL TESTS:

## 2018-10-16 NOTE — BRIEF OPERATIVE NOTE - COMMENTS
Post-Op Plan  1. Pain control  2. WBAT  3. PT/OOB  4. DVT PPX: Lovenox  5. FU labs  6. Dispo planning

## 2018-10-17 VITALS
SYSTOLIC BLOOD PRESSURE: 123 MMHG | RESPIRATION RATE: 18 BRPM | OXYGEN SATURATION: 98 % | HEART RATE: 86 BPM | TEMPERATURE: 99 F | DIASTOLIC BLOOD PRESSURE: 65 MMHG

## 2018-10-17 DIAGNOSIS — D64.9 ANEMIA, UNSPECIFIED: ICD-10-CM

## 2018-10-17 LAB
ANION GAP SERPL CALC-SCNC: 11 MMOL/L — SIGNIFICANT CHANGE UP (ref 5–17)
BUN SERPL-MCNC: 15 MG/DL — SIGNIFICANT CHANGE UP (ref 8–20)
CALCIUM SERPL-MCNC: 8.7 MG/DL — SIGNIFICANT CHANGE UP (ref 8.6–10.2)
CHLORIDE SERPL-SCNC: 103 MMOL/L — SIGNIFICANT CHANGE UP (ref 98–107)
CO2 SERPL-SCNC: 23 MMOL/L — SIGNIFICANT CHANGE UP (ref 22–29)
CREAT SERPL-MCNC: 0.45 MG/DL — LOW (ref 0.5–1.3)
GLUCOSE SERPL-MCNC: 107 MG/DL — SIGNIFICANT CHANGE UP (ref 70–115)
HCT VFR BLD CALC: 29.7 % — LOW (ref 37–47)
HGB BLD-MCNC: 9.6 G/DL — LOW (ref 12–16)
MCHC RBC-ENTMCNC: 29.3 PG — SIGNIFICANT CHANGE UP (ref 27–31)
MCHC RBC-ENTMCNC: 32.3 G/DL — SIGNIFICANT CHANGE UP (ref 32–36)
MCV RBC AUTO: 90.5 FL — SIGNIFICANT CHANGE UP (ref 81–99)
PLATELET # BLD AUTO: 272 K/UL — SIGNIFICANT CHANGE UP (ref 150–400)
POTASSIUM SERPL-MCNC: 3.7 MMOL/L — SIGNIFICANT CHANGE UP (ref 3.5–5.3)
POTASSIUM SERPL-SCNC: 3.7 MMOL/L — SIGNIFICANT CHANGE UP (ref 3.5–5.3)
RBC # BLD: 3.28 M/UL — LOW (ref 4.4–5.2)
RBC # FLD: 14.1 % — SIGNIFICANT CHANGE UP (ref 11–15.6)
SODIUM SERPL-SCNC: 137 MMOL/L — SIGNIFICANT CHANGE UP (ref 135–145)
WBC # BLD: 9.3 K/UL — SIGNIFICANT CHANGE UP (ref 4.8–10.8)
WBC # FLD AUTO: 9.3 K/UL — SIGNIFICANT CHANGE UP (ref 4.8–10.8)

## 2018-10-17 PROCEDURE — 99233 SBSQ HOSP IP/OBS HIGH 50: CPT

## 2018-10-17 PROCEDURE — 99232 SBSQ HOSP IP/OBS MODERATE 35: CPT

## 2018-10-17 RX ORDER — POLYETHYLENE GLYCOL 3350 17 G/17G
17 POWDER, FOR SOLUTION ORAL ONCE
Qty: 0 | Refills: 0 | Status: COMPLETED | OUTPATIENT
Start: 2018-10-17 | End: 2018-10-17

## 2018-10-17 RX ORDER — LATANOPROST 0.05 MG/ML
1 SOLUTION/ DROPS OPHTHALMIC; TOPICAL AT BEDTIME
Qty: 0 | Refills: 0 | Status: DISCONTINUED | OUTPATIENT
Start: 2018-10-17 | End: 2018-10-18

## 2018-10-17 RX ORDER — DORZOLAMIDE HYDROCHLORIDE 20 MG/ML
1 SOLUTION/ DROPS OPHTHALMIC
Qty: 0 | Refills: 0 | Status: DISCONTINUED | OUTPATIENT
Start: 2018-10-17 | End: 2018-10-18

## 2018-10-17 RX ADMIN — POLYETHYLENE GLYCOL 3350 17 GRAM(S): 17 POWDER, FOR SOLUTION ORAL at 14:48

## 2018-10-17 RX ADMIN — Medication 1 MILLIGRAM(S): at 12:25

## 2018-10-17 RX ADMIN — Medication 325 MILLIGRAM(S): at 12:25

## 2018-10-17 RX ADMIN — Medication 500 MILLIGRAM(S): at 18:26

## 2018-10-17 RX ADMIN — Medication 100 MILLIGRAM(S): at 20:58

## 2018-10-17 RX ADMIN — Medication 1 TABLET(S): at 12:25

## 2018-10-17 RX ADMIN — CEFTRIAXONE 100 GRAM(S): 500 INJECTION, POWDER, FOR SOLUTION INTRAMUSCULAR; INTRAVENOUS at 05:18

## 2018-10-17 RX ADMIN — ENOXAPARIN SODIUM 40 MILLIGRAM(S): 100 INJECTION SUBCUTANEOUS at 05:19

## 2018-10-17 RX ADMIN — Medication 37.5 MILLIGRAM(S): at 12:25

## 2018-10-17 RX ADMIN — Medication 100 MILLIGRAM(S): at 05:19

## 2018-10-17 RX ADMIN — Medication 100 MILLIGRAM(S): at 14:47

## 2018-10-17 RX ADMIN — OXYCODONE HYDROCHLORIDE 10 MILLIGRAM(S): 5 TABLET ORAL at 15:00

## 2018-10-17 RX ADMIN — Medication 325 MILLIGRAM(S): at 08:59

## 2018-10-17 RX ADMIN — Medication 500 MILLIGRAM(S): at 05:19

## 2018-10-17 RX ADMIN — Medication 325 MILLIGRAM(S): at 18:26

## 2018-10-17 RX ADMIN — PANTOPRAZOLE SODIUM 40 MILLIGRAM(S): 20 TABLET, DELAYED RELEASE ORAL at 05:19

## 2018-10-17 RX ADMIN — LATANOPROST 1 DROP(S): 0.05 SOLUTION/ DROPS OPHTHALMIC; TOPICAL at 20:58

## 2018-10-17 RX ADMIN — OXYCODONE HYDROCHLORIDE 10 MILLIGRAM(S): 5 TABLET ORAL at 14:52

## 2018-10-17 NOTE — OCCUPATIONAL THERAPY INITIAL EVALUATION ADULT - ADDITIONAL COMMENTS
Pt lives in downstairs apartment of son's home. It is a first floor, single level apartment with no DEBO and no steps inside. Bathroom has shower stall with doors. Pt owns a RW. Pt is right handed.

## 2018-10-17 NOTE — PROGRESS NOTE ADULT - SUBJECTIVE AND OBJECTIVE BOX
PA - Note    S/P IM Nailing of Left Hip POD #1.  Patient was found awake laying in bed comfortable.  No issues reported overnight.      Vital Signs Last 24 Hrs  T(C): 37.6 (17 Oct 2018 04:49), Max: 37.6 (17 Oct 2018 04:49)  T(F): 99.7 (17 Oct 2018 04:49), Max: 99.7 (17 Oct 2018 04:49)  HR: 88 (17 Oct 2018 04:49) (69 - 91)  BP: 110/60 (17 Oct 2018 04:49) (94/56 - 133/53)  BP(mean): --  RR: 18 (17 Oct 2018 04:49) (14 - 18)  SpO2: 98% (17 Oct 2018 04:49) (95% - 100%)    Left Lower Extremity:  Surgical Dressing C/D/I, no signs of active drainage  Calf soft, non-tender  2+ Pedal pulse, brisk capillary refill  + Sensation  + ROM of toes, + Dorsal/Plantar flexion of foot    A/P: Left Hip IM Nailing  - OOB, PT, WBAT  - DVT Prophylaxis: Lovenox, SCDs  - Pain medication as needed for comfort  - Continue medical management per medical team

## 2018-10-17 NOTE — OCCUPATIONAL THERAPY INITIAL EVALUATION ADULT - MANUAL MUSCLE TESTING RESULTS, REHAB EVAL
grossly assessed due to/confusion, pt with difficulty following commands of assessment; bilateral shoulders with AROM against gravity 3/5, bilateral elbows with AROM against gravity 3/5, bilateral gross grasp 4/5

## 2018-10-17 NOTE — PHYSICAL THERAPY INITIAL EVALUATION ADULT - ACTIVE RANGE OF MOTION EXAMINATION, REHAB EVAL
Right LE Active ROM was WFL (within functional limits)/deficits as listed below/bilateral upper extremity Active ROM was WFL (within functional limits)/left hip flexion to 90 sitting at edge of bed

## 2018-10-17 NOTE — PROGRESS NOTE ADULT - ASSESSMENT
A/P: : 84 y/o F PMHx of Afib (not on AC, unclear history), HTN (not currently on medications), Breast Ca s/p partial right mastectomy, frequent falls with b/l proximal humerus fractures who presents to the ED after a fall with left hip pain found to have left IT fracture. EKG with NSR, no acute changes. CXR with cardiomegaly, small left pleural effusion. Trop pending. Echo pending.     1. Mercedes-operative Cardiac Risk Stratification   tolerated surgery well. No cardiac complication.   2)- constipation: Need colace and senna.     3. Afib  resume home meds after surgery  long term anticoagulation as per primary cardiologist   3) Deep venous thrombosis prophylaxis: heparin or lovenox SQ.

## 2018-10-17 NOTE — OCCUPATIONAL THERAPY INITIAL EVALUATION ADULT - SENSORY TESTS
pt denies changes with sensation; pt with +bilateral pedal pulses; pt with +capillary refill in bilateral toes

## 2018-10-17 NOTE — OCCUPATIONAL THERAPY INITIAL EVALUATION ADULT - PERTINENT HX OF CURRENT PROBLEM, REHAB EVAL
Pt presents to ED with c/o left hip pain s/p mechanical fall. X-ray reveals left femoral intertrochanteric fracture.

## 2018-10-17 NOTE — PROGRESS NOTE ADULT - SUBJECTIVE AND OBJECTIVE BOX
ADRIÁN MARTE    [id number]    85y      Female    CC: left hip fracture    INTERVAL HPI/OVERNIGHT EVENTS:  Pt is an 84 y/o F pt with a pmhx of HTN, a-fib and breast CA, b/l prox humerus fxs, frequent falls as per EMR review and PSHx of partial R mastectomy who presents to the ED c/o L hip pain with inability to ambulate after having a fall while at home. Pt ambulates with walker and tripped attempting to reach it to ambulate away from sink where she was washing dishes. Pt had similar episode in 2016 requiring sutures for head laceration and prior to that for which she sustained b/l humeral fractures. Pt did hit head but denies losing consciousness and any preceding symptoms including palpitations, sob, chest pain, HA, visual changes, numbness, tingling, tongue biting, urinary or fecal incontinence.     this morning pt states pain is well controlled, no sob no chest pain no new events overnight    REVIEW OF SYSTEMS:    CONSTITUTIONAL: No fever, weight loss, or fatigue  RESPIRATORY: No cough, wheezing, hemoptysis; No shortness of breath  CARDIOVASCULAR: No chest pain, palpitations  GASTROINTESTINAL: No abdominal or epigastric pain. No nausea, vomiting      Vital Signs Last 24 Hrs  T(C): 37.5 (17 Oct 2018 09:12), Max: 37.6 (17 Oct 2018 04:49)  T(F): 99.5 (17 Oct 2018 09:12), Max: 99.7 (17 Oct 2018 04:49)  HR: 98 (17 Oct 2018 09:12) (84 - 98)  BP: 134/82 (17 Oct 2018 09:12) (94/56 - 134/82)  BP(mean): --  RR: 18 (17 Oct 2018 09:12) (18 - 18)  SpO2: 99% (17 Oct 2018 09:12) (95% - 99%)    PHYSICAL EXAM:    GENERAL: NAD, well-groomed  HEENT: PERRL, +EOMI  CHEST/LUNG: Clear to auscultation bilaterally; No wheezing  HEART: S1S2+, Regular rate and rhythm; No murmurs  ABDOMEN: Soft, Nontender, Nondistended; Bowel sounds present  EXTREMITIES:  2+ Peripheral Pulses, No clubbing, cyanosis, or edema        LABS:                        9.6    9.3   )-----------( 272      ( 17 Oct 2018 06:59 )             29.7     10-17    137  |  103  |  15.0  ----------------------------<  107  3.7   |  23.0  |  0.45<L>    Ca    8.7      17 Oct 2018 06:59  Phos  3.4     10-15  Mg     1.9     10-16    TPro  7.9  /  Alb  3.9  /  TBili  0.4  /  DBili  x   /  AST  26  /  ALT  11  /  AlkPhos  77  10-15    PT/INR - ( 15 Oct 2018 13:37 )   PT: 10.5 sec;   INR: 0.96 ratio         PTT - ( 15 Oct 2018 13:37 )  PTT:26.2 sec  Urinalysis Basic - ( 16 Oct 2018 01:08 )    Color: Yellow / Appearance: Clear / S.015 / pH: x  Gluc: x / Ketone: Negative  / Bili: Negative / Urobili: Negative mg/dL   Blood: x / Protein: 30 mg/dL / Nitrite: Positive   Leuk Esterase: Moderate / RBC: 25-50 /HPF / WBC >50   Sq Epi: x / Non Sq Epi: Few / Bacteria: TNTC          MEDICATIONS  (STANDING):  acetaminophen  IVPB .. 750 milliGRAM(s) IV Intermittent once  ascorbic acid 500 milliGRAM(s) Oral two times a day  cefTRIAXone   IVPB 1 Gram(s) IV Intermittent every 24 hours  docusate sodium 100 milliGRAM(s) Oral three times a day  enoxaparin Injectable 40 milliGRAM(s) SubCutaneous every 24 hours  ferrous    sulfate 325 milliGRAM(s) Oral three times a day with meals  folic acid 1 milliGRAM(s) Oral daily  multivitamin 1 Tablet(s) Oral daily  pantoprazole    Tablet 40 milliGRAM(s) Oral before breakfast  venlafaxine XR. 37.5 milliGRAM(s) Oral daily    MEDICATIONS  (PRN):  acetaminophen   Tablet .. 650 milliGRAM(s) Oral every 6 hours PRN Temp greater or equal to 38C (100.4F)  acetaminophen   Tablet .. 975 milliGRAM(s) Oral every 8 hours PRN Mild Pain (1 - 3)  ALPRAZolam 0.5 milliGRAM(s) Oral every 6 hours PRN anxiety  ondansetron Injectable 4 milliGRAM(s) IV Push every 6 hours PRN Nausea and/or Vomiting  oxyCODONE    IR 10 milliGRAM(s) Oral every 4 hours PRN Severe Pain (7 - 10)  oxyCODONE    IR 5 milliGRAM(s) Oral every 4 hours PRN Moderate Pain (4 - 6)

## 2018-10-17 NOTE — PROGRESS NOTE ADULT - PROBLEM SELECTOR PROBLEM 1
Subjective:       Patient ID: Emily Max is a 44 y.o. female.    Chief Complaint: Consult    CC: I have literally tried everything under the sun to lose weight.     Current attempts at weight loss: New pt to me, referred by Ivana Cotton MD  with Patient Active Problem List:     Hypothyroidism     Hypertension     Mild vitamin D deficiency     Anxiety     Depression     Varicose vein of leg     Urinary, incontinence, stress female     Obesity     Venous insufficiency of both lower extremities     History of cervical cancer     Pre-diabetes     Surgical menopause     Periodic headache syndrome without status migrainosus, not intractable     Knee internal derangement     Decreased ROM of right knee    Lab Results       Component                Value               Date                       TSH                      1.904               05/17/2017              Lab Results       Component                Value               Date                       CHOL                     214 (H)             05/17/2017                 CHOL                     211 (H)             05/13/2015                 CHOL                     177                 03/21/2014            Lab Results       Component                Value               Date                       HDL                      71                  05/17/2017                 HDL                      74                  05/13/2015                 HDL                      61                  03/21/2014            Lab Results       Component                Value               Date                       LDLCALC                  127.4               05/17/2017                 LDLCALC                  121.2               05/13/2015                 LDLCALC                  101.4               03/21/2014            Lab Results       Component                Value               Date                       TRIG                     78                  05/17/2017                  TRIG                     79                  05/13/2015                 TRIG                     73                  03/21/2014            Lab Results       Component                Value               Date                       CHOLHDL                  33.2                05/17/2017                 CHOLHDL                  35.1                05/13/2015                 CHOLHDL                  34.5                03/21/2014               No efforts currently. Not exercising since knee surgery. Desccribes herself as an emotional eater.     Previous diet attempts: Sensible portions for 4 months. Lost 10 lbs on 1500 catarino. Has gained it back. WW, Jenn haynes. Exercise- but never sticks with it.     History of medication for loss: Lost 4 lbs last year on 2-3 months of phentermine. Not interested in surgery    Heaviest weight: 232    Lightest weight: 115    Goal weight: 140-150      Typical eating patterns: Works in [employer] in [place]. 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 Pt cooks about 1 time a week.   Breakfast: Skips or piece of bread.     Lunch: cafeteria- red beans and rice with 1-2 pieces of sausage. Burger. Salads. Fried fish or gumbo. If off- BK or five guys burger. Greek food.soup or salad.     Dinner: sushi, roast, cereal.     Snacks: chips, olives, carrots, popcorn, half PB sandwich.     Beverages: soda- 4 cans a day, water, milk, coffee- 2 cups black or creamer and sugar, wine 1-2/day    Willingness to change:  9/10    EKG:Normal sinus rhythm  Normal ECG  When compared with ECG of 27-AUG-2010 10:04,  Nonspecific T wave abnormality now evident in Inferior leads    BMR:1706        Review of Systems   Constitutional: Negative for chills and fever.   Respiratory: Negative for apnea and shortness of breath.    Cardiovascular: Positive for leg swelling. Negative for chest pain.   Gastrointestinal: Negative for constipation and diarrhea.        + GERD   Genitourinary: Negative for difficulty urinating and dysuria.  "  Musculoskeletal: Positive for arthralgias. Negative for back pain.        Knee   Neurological: Negative for dizziness and light-headedness.   Psychiatric/Behavioral: Positive for dysphoric mood. The patient is nervous/anxious.        Objective:     /82   Pulse 82   Ht 5' 5" (1.651 m)   Wt 105.3 kg (232 lb 2.3 oz)   BMI 38.63 kg/m²     Physical Exam   Constitutional: She is oriented to person, place, and time. She appears well-developed. No distress.   Obese   HENT:   Head: Normocephalic and atraumatic.   Eyes: EOM are normal. Pupils are equal, round, and reactive to light. No scleral icterus.   Neck: Normal range of motion. Neck supple. No thyromegaly present.   Cardiovascular: Normal rate and normal heart sounds.  Exam reveals no gallop and no friction rub.    No murmur heard.  Pulmonary/Chest: Effort normal and breath sounds normal. No respiratory distress. She has no wheezes.   Abdominal: Soft. Bowel sounds are normal. She exhibits no distension. There is no tenderness.   Musculoskeletal: Normal range of motion. She exhibits no edema.   Neurological: She is alert and oriented to person, place, and time. No cranial nerve deficit.   Skin: Skin is warm and dry. No erythema.   Psychiatric: She has a normal mood and affect. Her behavior is normal. Judgment normal.   Vitals reviewed.      Assessment:       1. Severe obesity (BMI 35.0-39.9) with comorbidity    2. Pre-diabetes    3. Venous insufficiency of both lower extremities    4. Essential hypertension        Plan:         1. Severe obesity (BMI 35.0-39.9) with comorbidity  Patient warned of common side effects of diethylpropion including anxiety, insomnia, palpitations and increased blood pressure. It was also explained that it is for short-term usage along with diet and exercise, and that stopping the medication without making lifestyle changes will result in regain of weight. Patient states understanding.    Weight loss medications are controlled " Intertrochanteric fracture of left femur, closed, initial encounter substances.  They require routine follow up. Prescription or pills that are lost or destroyed will not be replaced.         3 meals a day made up of the following:  Unlimited green vegetables, tomatoes, mushrooms, spaghetti squash, cauliflower, meat, poultry, seafood, eggs and hard cheeses.   Milk and plain yogurt  Dressings, seasonings, condiments, etc should have less than 2 g sugars.   beans or nuts can have 1 x a day.   1-2 servings of citrus fruits, berries, pineapple or melon a day (1/2 cup)  Avoid fried foods    No grains, rice, pasta, potatoes, bread, corn, peas, oatmeal, grits, tortillas, crackers, chips    No soda, sweet tea, juices or lemonade    [email or web address] for recipes.    Limit alcohol to 4 drinks per week.     Increase as tolerated      Patient counseled in strategies for long term weight loss and maintenance: Keeping a food diary, exercise for 1 hour a day and eating breakfast everyday.      Meal ideas and coffee ideas given.     2. Pre-diabetes  Discussed decreasing the risks of diabetes with changes in diet, routine exercise and losing weight.  Could also consider metformin.      3. Venous insufficiency of both lower extremities  Expect improvement in symptoms with weight loss.     4. Essential hypertension  The current medical regimen is effective;  continue present plan and medications.

## 2018-10-17 NOTE — OCCUPATIONAL THERAPY INITIAL EVALUATION ADULT - MODIFIED CLINICAL TEST OF SENSORY INTEGRATION IN BALANCE TEST
static/dynamic sitting edge of bed with contact guard assistance; static/dynamic standing with RW with minimum/moderate assistance

## 2018-10-17 NOTE — OCCUPATIONAL THERAPY INITIAL EVALUATION ADULT - NS ASR FOLLOW COMMAND OT EVAL
75% of the time/able to follow single-step instructions/pt requires increased time and repetition to process commands of tasks; pt with decreased sequencing requiring step-by-step cues; pt with decreased sustained attention with cues to attend to tasks

## 2018-10-17 NOTE — PHYSICAL THERAPY INITIAL EVALUATION ADULT - ADDITIONAL COMMENTS
Pt lives in a house with 0 steps to enter  and 0  stairs inside.  Pt owns medical equipment: RW   Pt lives with: SOn and daughter in law  Pt states they are not available to provide 24hr care, they work   PT inable to verify if history is accurate, pt is confused at time of eval

## 2018-10-17 NOTE — OCCUPATIONAL THERAPY INITIAL EVALUATION ADULT - LEVEL OF INDEPENDENCE:TOILET, OT EVAL
dependent (less than 25% patients effort)/hygiene and brief management with urinary incontinence; RN made aware

## 2018-10-18 LAB
-  AMIKACIN: SIGNIFICANT CHANGE UP
-  AMPICILLIN/SULBACTAM: SIGNIFICANT CHANGE UP
-  AMPICILLIN: SIGNIFICANT CHANGE UP
-  AZTREONAM: SIGNIFICANT CHANGE UP
-  CEFAZOLIN: SIGNIFICANT CHANGE UP
-  CEFEPIME: SIGNIFICANT CHANGE UP
-  CEFOXITIN: SIGNIFICANT CHANGE UP
-  CEFTRIAXONE: SIGNIFICANT CHANGE UP
-  CIPROFLOXACIN: SIGNIFICANT CHANGE UP
-  ERTAPENEM: SIGNIFICANT CHANGE UP
-  GENTAMICIN: SIGNIFICANT CHANGE UP
-  IMIPENEM: SIGNIFICANT CHANGE UP
-  LEVOFLOXACIN: SIGNIFICANT CHANGE UP
-  MEROPENEM: SIGNIFICANT CHANGE UP
-  NITROFURANTOIN: SIGNIFICANT CHANGE UP
-  PIPERACILLIN/TAZOBACTAM: SIGNIFICANT CHANGE UP
-  TIGECYCLINE: SIGNIFICANT CHANGE UP
-  TOBRAMYCIN: SIGNIFICANT CHANGE UP
-  TRIMETHOPRIM/SULFAMETHOXAZOLE: SIGNIFICANT CHANGE UP
ANION GAP SERPL CALC-SCNC: 11 MMOL/L — SIGNIFICANT CHANGE UP (ref 5–17)
BUN SERPL-MCNC: 19 MG/DL — SIGNIFICANT CHANGE UP (ref 8–20)
CALCIUM SERPL-MCNC: 8.7 MG/DL — SIGNIFICANT CHANGE UP (ref 8.6–10.2)
CHLORIDE SERPL-SCNC: 102 MMOL/L — SIGNIFICANT CHANGE UP (ref 98–107)
CO2 SERPL-SCNC: 22 MMOL/L — SIGNIFICANT CHANGE UP (ref 22–29)
CREAT SERPL-MCNC: 0.5 MG/DL — SIGNIFICANT CHANGE UP (ref 0.5–1.3)
CULTURE RESULTS: SIGNIFICANT CHANGE UP
GLUCOSE SERPL-MCNC: 105 MG/DL — SIGNIFICANT CHANGE UP (ref 70–115)
HCT VFR BLD CALC: 31 % — LOW (ref 37–47)
HGB BLD-MCNC: 9.9 G/DL — LOW (ref 12–16)
MAGNESIUM SERPL-MCNC: 1.8 MG/DL — SIGNIFICANT CHANGE UP (ref 1.8–2.6)
MCHC RBC-ENTMCNC: 29.6 PG — SIGNIFICANT CHANGE UP (ref 27–31)
MCHC RBC-ENTMCNC: 31.9 G/DL — LOW (ref 32–36)
MCV RBC AUTO: 92.8 FL — SIGNIFICANT CHANGE UP (ref 81–99)
METHOD TYPE: SIGNIFICANT CHANGE UP
ORGANISM # SPEC MICROSCOPIC CNT: SIGNIFICANT CHANGE UP
ORGANISM # SPEC MICROSCOPIC CNT: SIGNIFICANT CHANGE UP
PLATELET # BLD AUTO: 306 K/UL — SIGNIFICANT CHANGE UP (ref 150–400)
POTASSIUM SERPL-MCNC: 3.6 MMOL/L — SIGNIFICANT CHANGE UP (ref 3.5–5.3)
POTASSIUM SERPL-SCNC: 3.6 MMOL/L — SIGNIFICANT CHANGE UP (ref 3.5–5.3)
RBC # BLD: 3.34 M/UL — LOW (ref 4.4–5.2)
RBC # FLD: 14.2 % — SIGNIFICANT CHANGE UP (ref 11–15.6)
SODIUM SERPL-SCNC: 135 MMOL/L — SIGNIFICANT CHANGE UP (ref 135–145)
SPECIMEN SOURCE: SIGNIFICANT CHANGE UP
WBC # BLD: 7.8 K/UL — SIGNIFICANT CHANGE UP (ref 4.8–10.8)
WBC # FLD AUTO: 7.8 K/UL — SIGNIFICANT CHANGE UP (ref 4.8–10.8)

## 2018-10-18 PROCEDURE — 99239 HOSP IP/OBS DSCHRG MGMT >30: CPT

## 2018-10-18 PROCEDURE — 99233 SBSQ HOSP IP/OBS HIGH 50: CPT

## 2018-10-18 RX ORDER — ENOXAPARIN SODIUM 100 MG/ML
40 INJECTION SUBCUTANEOUS
Qty: 0 | Refills: 0 | COMMUNITY
Start: 2018-10-18

## 2018-10-18 RX ORDER — ASCORBIC ACID 60 MG
1 TABLET,CHEWABLE ORAL
Qty: 0 | Refills: 0 | COMMUNITY
Start: 2018-10-18

## 2018-10-18 RX ORDER — CEFPODOXIME PROXETIL 100 MG
1 TABLET ORAL
Qty: 0 | Refills: 0 | COMMUNITY

## 2018-10-18 RX ORDER — FERROUS SULFATE 325(65) MG
1 TABLET ORAL
Qty: 30 | Refills: 0 | OUTPATIENT
Start: 2018-10-18 | End: 2018-11-16

## 2018-10-18 RX ORDER — DORZOLAMIDE HYDROCHLORIDE 20 MG/ML
1 SOLUTION/ DROPS OPHTHALMIC
Qty: 0 | Refills: 0 | COMMUNITY
Start: 2018-10-18

## 2018-10-18 RX ORDER — DOCUSATE SODIUM 100 MG
1 CAPSULE ORAL
Qty: 0 | Refills: 0 | COMMUNITY
Start: 2018-10-18

## 2018-10-18 RX ORDER — OXYCODONE HYDROCHLORIDE 5 MG/1
1 TABLET ORAL
Qty: 0 | Refills: 0 | COMMUNITY
Start: 2018-10-18

## 2018-10-18 RX ORDER — FOLIC ACID 0.8 MG
1 TABLET ORAL
Qty: 0 | Refills: 0 | COMMUNITY
Start: 2018-10-18

## 2018-10-18 RX ORDER — LATANOPROST 0.05 MG/ML
1 SOLUTION/ DROPS OPHTHALMIC; TOPICAL
Qty: 0 | Refills: 0 | COMMUNITY
Start: 2018-10-18

## 2018-10-18 RX ADMIN — CEFTRIAXONE 100 GRAM(S): 500 INJECTION, POWDER, FOR SOLUTION INTRAMUSCULAR; INTRAVENOUS at 05:09

## 2018-10-18 RX ADMIN — Medication 325 MILLIGRAM(S): at 09:11

## 2018-10-18 RX ADMIN — Medication 1 MILLIGRAM(S): at 13:22

## 2018-10-18 RX ADMIN — DORZOLAMIDE HYDROCHLORIDE 1 DROP(S): 20 SOLUTION/ DROPS OPHTHALMIC at 09:13

## 2018-10-18 RX ADMIN — OXYCODONE HYDROCHLORIDE 10 MILLIGRAM(S): 5 TABLET ORAL at 11:00

## 2018-10-18 RX ADMIN — PANTOPRAZOLE SODIUM 40 MILLIGRAM(S): 20 TABLET, DELAYED RELEASE ORAL at 05:08

## 2018-10-18 RX ADMIN — Medication 1 TABLET(S): at 13:22

## 2018-10-18 RX ADMIN — Medication 37.5 MILLIGRAM(S): at 13:21

## 2018-10-18 RX ADMIN — OXYCODONE HYDROCHLORIDE 10 MILLIGRAM(S): 5 TABLET ORAL at 10:21

## 2018-10-18 RX ADMIN — ENOXAPARIN SODIUM 40 MILLIGRAM(S): 100 INJECTION SUBCUTANEOUS at 05:09

## 2018-10-18 RX ADMIN — Medication 100 MILLIGRAM(S): at 05:08

## 2018-10-18 RX ADMIN — Medication 325 MILLIGRAM(S): at 13:21

## 2018-10-18 RX ADMIN — Medication 500 MILLIGRAM(S): at 05:08

## 2018-10-18 NOTE — PROGRESS NOTE ADULT - SUBJECTIVE AND OBJECTIVE BOX
ORTHOPEDIC POST-OP PROGRESS NOTE:    Name: ADRIÁN MARTE    MR #: [id number]    Procedure: Intramedulary nail of left hip      DOS: 10/16/2018      Pt comfortable without complaints, pain controlled. Denies CP, SOB, N/V, numbness/tingling. She is pending transfer to rehab.                            9.9    7.8   )-----------( 306      ( 18 Oct 2018 05:57 )             31.0       18 Oct 2018 05:57    135    |  102    |  19.0   ----------------------------<  105    3.6     |  22.0   |  0.50     Ca    8.7        18 Oct 2018 05:57  Mg     1.8       18 Oct 2018 05:57      Vital Signs Last 24 Hrs  T(C): 37.3 (17 Oct 2018 23:51), Max: 37.5 (17 Oct 2018 09:12)  T(F): 99.2 (17 Oct 2018 23:51), Max: 99.5 (17 Oct 2018 09:12)  HR: 86 (17 Oct 2018 23:51) (86 - 98)  BP: 123/65 (17 Oct 2018 23:51) (113/61 - 134/82)  BP(mean): --  RR: 18 (17 Oct 2018 23:51) (18 - 18)  SpO2: 98% (17 Oct 2018 23:51) (97% - 99%)      General Exam:    General: Pt Alert and oriented, NAD, controlled pain.    Dressings C/D/I. No bleeding. No erythema.    Skin: Wound intact with Prineo tape. No erythema. No wound dehiscence. No discharge. No ecchymosis.     Pulses: 2+ dorsalis pedis pulse. Cap refill < 2 sec.    Sensation: Grossly intact to light touch without deficit.    Motor: No motor weaknesses found.        A/P: 85y Female  POD# 2 s/p IM nail of the left hip    - Stable  - Pain Control  - DVT ppx as prescribed  - PT eval  - Weight bearing status: WBAT with walker  * Ortho ready for discharge to rehab once medically stable and bed available  * Dressings changed

## 2018-10-18 NOTE — PROGRESS NOTE ADULT - SUBJECTIVE AND OBJECTIVE BOX
[facility]                                                        Office: [address] [address]                                                       Telephone: [phone]. Fax:[phone]                                                                             PROGRESS NOTE   Reason for follow up:  pre-operative cardiovascular risk evaluation and management                             Overnight: No new events.   Update:  no new updates. planning for discharge.       Subjective: "  i am ok"   Complains of:    passing a lot of gas.   Review of symptoms: Cardiac:  No chest pain. No dyspnea. No palpitations.  Respiratory: no cough. No dyspnea  Gastrointestinal: No diarrhea. No abdominal pain. No bleeding.  + Constipation     Past medical history: No updates.   Chronic conditions:  Hypertension: controlled.   	  Vitals:  Vital Signs Last 24 Hrs  T(C): 37.3 (10-17-18 @ 23:51), Max: 37.3 (10-17-18 @ 23:51)  T(F): 99.2 (10-17-18 @ 23:51), Max: 99.2 (10-17-18 @ 23:51)  HR: 86 (10-17-18 @ 23:51) (86 - 91)  BP: 123/65 (10-17-18 @ 23:51) (113/61 - 123/65)  BP(mean): --  RR: 18 (10-17-18 @ 23:51) (18 - 18)  SpO2: 98% (10-17-18 @ 23:51) (98% - 98%)      Weight (kg): 45.4 (10-14 @ 23:33)    PHYSICAL EXAM:  Appearance: Comfortable. No acute distress  HEENT:  Head and neck: Atraumatic. Normocephalic.  Normal oral mucosa, PERRL, Neck is supple. No JVD, No carotid bruit.   Neurologic: A & O x 3, no focal deficits. EOMI , Cranial nerves are intact.  Lymphatic: No cervical lymphadenopathy  Cardiovascular: Normal S1 S2, No murmur, rubs/gallops. No JVD, No edema  Respiratory: Lungs clear to auscultation  Gastrointestinal:  Soft, Non-tender, + BS  Lower Extremities: No edema  Psychiatry: Patient is calm. No agitation. Mood & affect appropriate  Skin: No rashes/ ecchymoses/cyanosis/ulcers visualized on the face, hands or feet.    CURRENT MEDICATIONS:    MEDICATIONS  (STANDING):    docusate sodium  pantoprazole    Tablet  cefTRIAXone   IVPB  acetaminophen  IVPB ..  ascorbic acid  dorzolamide 2% Ophthalmic Solution  enoxaparin Injectable  ferrous    sulfate  folic acid  latanoprost 0.005% Ophthalmic Solution  multivitamin  venlafaxine XR.    PRN: acetaminophen   Tablet .. PRN  acetaminophen   Tablet .. PRN  ALPRAZolam PRN  ondansetron Injectable PRN  oxyCODONE    IR PRN  oxyCODONE    IR PRN      LABS:	 	  CARDIAC MARKERS ( 15 Oct 2018 13:37                                     9.9    7.8   )-----------( 306      ( 18 Oct 2018 05:57 )             31.0   N=x    ; L=x        18 Oct 2018 05:57    135    |  102    |  19.0   ----------------------------<  105    3.6     |  22.0   |  0.50     Ca    8.7        18 Oct 2018 05:57  Mg     1.8       18 Oct 2018 05:57

## 2018-10-18 NOTE — PROGRESS NOTE ADULT - REASON FOR ADMISSION
fall, leg pain

## 2018-10-24 PROBLEM — S82.90XA UNSPECIFIED FRACTURE OF UNSPECIFIED LOWER LEG, INITIAL ENCOUNTER FOR CLOSED FRACTURE: Chronic | Status: ACTIVE | Noted: 2018-10-15
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
## 2018-10-29 PROBLEM — Z87.81 S/P LEFT HIP FRACTURE: Status: ACTIVE | Noted: 2018-10-29

## 2018-10-31 ENCOUNTER — APPOINTMENT (OUTPATIENT)
Dept: ORTHOPEDIC SURGERY | Facility: CLINIC | Age: 83
End: 2018-10-31
Payer: MEDICARE

## 2018-10-31 VITALS
HEART RATE: 78 BPM | BODY MASS INDEX: 16.64 KG/M2 | SYSTOLIC BLOOD PRESSURE: 108 MMHG | DIASTOLIC BLOOD PRESSURE: 67 MMHG | HEIGHT: 67 IN | WEIGHT: 106 LBS

## 2018-10-31 DIAGNOSIS — Z87.81 PERSONAL HISTORY OF (HEALED) TRAUMATIC FRACTURE: ICD-10-CM

## 2018-10-31 PROCEDURE — 73502 X-RAY EXAM HIP UNI 2-3 VIEWS: CPT | Mod: LT

## 2018-10-31 PROCEDURE — 99024 POSTOP FOLLOW-UP VISIT: CPT

## 2018-11-11 PROCEDURE — 80048 BASIC METABOLIC PNL TOTAL CA: CPT

## 2018-11-11 PROCEDURE — 76000 FLUOROSCOPY <1 HR PHYS/QHP: CPT

## 2018-11-11 PROCEDURE — 99285 EMERGENCY DEPT VISIT HI MDM: CPT | Mod: 25

## 2018-11-11 PROCEDURE — 70450 CT HEAD/BRAIN W/O DYE: CPT

## 2018-11-11 PROCEDURE — 87086 URINE CULTURE/COLONY COUNT: CPT

## 2018-11-11 PROCEDURE — 93005 ELECTROCARDIOGRAM TRACING: CPT

## 2018-11-11 PROCEDURE — 73502 X-RAY EXAM HIP UNI 2-3 VIEWS: CPT

## 2018-11-11 PROCEDURE — 72170 X-RAY EXAM OF PELVIS: CPT

## 2018-11-11 PROCEDURE — 73552 X-RAY EXAM OF FEMUR 2/>: CPT

## 2018-11-11 PROCEDURE — 87186 SC STD MICRODIL/AGAR DIL: CPT

## 2018-11-11 PROCEDURE — 36415 COLL VENOUS BLD VENIPUNCTURE: CPT

## 2018-11-11 PROCEDURE — 86850 RBC ANTIBODY SCREEN: CPT

## 2018-11-11 PROCEDURE — 81001 URINALYSIS AUTO W/SCOPE: CPT

## 2018-11-11 PROCEDURE — 83735 ASSAY OF MAGNESIUM: CPT

## 2018-11-11 PROCEDURE — 36430 TRANSFUSION BLD/BLD COMPNT: CPT

## 2018-11-11 PROCEDURE — 93306 TTE W/DOPPLER COMPLETE: CPT

## 2018-11-11 PROCEDURE — 71045 X-RAY EXAM CHEST 1 VIEW: CPT

## 2018-11-11 PROCEDURE — 96374 THER/PROPH/DIAG INJ IV PUSH: CPT

## 2018-11-11 PROCEDURE — 86901 BLOOD TYPING SEROLOGIC RH(D): CPT

## 2018-11-11 PROCEDURE — 86900 BLOOD TYPING SEROLOGIC ABO: CPT

## 2018-11-11 PROCEDURE — 84100 ASSAY OF PHOSPHORUS: CPT

## 2018-11-11 PROCEDURE — C1713: CPT

## 2018-11-11 PROCEDURE — 86923 COMPATIBILITY TEST ELECTRIC: CPT

## 2018-11-11 PROCEDURE — 85610 PROTHROMBIN TIME: CPT

## 2018-11-11 PROCEDURE — 97167 OT EVAL HIGH COMPLEX 60 MIN: CPT

## 2018-11-11 PROCEDURE — 96376 TX/PRO/DX INJ SAME DRUG ADON: CPT

## 2018-11-11 PROCEDURE — 85730 THROMBOPLASTIN TIME PARTIAL: CPT

## 2018-11-11 PROCEDURE — 83690 ASSAY OF LIPASE: CPT

## 2018-11-11 PROCEDURE — 84484 ASSAY OF TROPONIN QUANT: CPT

## 2018-11-11 PROCEDURE — 97163 PT EVAL HIGH COMPLEX 45 MIN: CPT

## 2018-11-11 PROCEDURE — P9016: CPT

## 2018-11-11 PROCEDURE — 80053 COMPREHEN METABOLIC PANEL: CPT

## 2018-11-11 PROCEDURE — 85027 COMPLETE CBC AUTOMATED: CPT

## 2018-11-11 PROCEDURE — C1769: CPT

## 2019-03-19 NOTE — OCCUPATIONAL THERAPY INITIAL EVALUATION ADULT - PERSONAL SAFETY AND JUDGMENT, REHAB EVAL
impaired/no unsafe behaviors demonstrated during evaluation however pt with +confusion
Alert and oriented to person, place and time

## 2020-02-10 ENCOUNTER — EMERGENCY (EMERGENCY)
Facility: HOSPITAL | Age: 85
LOS: 1 days | End: 2020-02-10
Attending: EMERGENCY MEDICINE
Payer: MEDICARE

## 2020-02-10 VITALS
WEIGHT: 139.99 LBS | HEIGHT: 65 IN | RESPIRATION RATE: 18 BRPM | HEART RATE: 78 BPM | OXYGEN SATURATION: 94 % | DIASTOLIC BLOOD PRESSURE: 69 MMHG | TEMPERATURE: 98 F | SYSTOLIC BLOOD PRESSURE: 111 MMHG

## 2020-02-10 DIAGNOSIS — Z90.11 ACQUIRED ABSENCE OF RIGHT BREAST AND NIPPLE: Chronic | ICD-10-CM

## 2020-02-10 LAB
ALBUMIN SERPL ELPH-MCNC: 3.4 G/DL — SIGNIFICANT CHANGE UP (ref 3.3–5.2)
ALP SERPL-CCNC: 86 U/L — SIGNIFICANT CHANGE UP (ref 40–120)
ALT FLD-CCNC: 13 U/L — SIGNIFICANT CHANGE UP
ANION GAP SERPL CALC-SCNC: 15 MMOL/L — SIGNIFICANT CHANGE UP (ref 5–17)
ANISOCYTOSIS BLD QL: SLIGHT — SIGNIFICANT CHANGE UP
AST SERPL-CCNC: 33 U/L — HIGH
BASOPHILS # BLD AUTO: 0.1 K/UL — SIGNIFICANT CHANGE UP (ref 0–0.2)
BASOPHILS NFR BLD AUTO: 0.9 % — SIGNIFICANT CHANGE UP (ref 0–2)
BILIRUB SERPL-MCNC: 0.2 MG/DL — LOW (ref 0.4–2)
BUN SERPL-MCNC: 15 MG/DL — SIGNIFICANT CHANGE UP (ref 8–20)
BURR CELLS BLD QL SMEAR: PRESENT — SIGNIFICANT CHANGE UP
CALCIUM SERPL-MCNC: 9.2 MG/DL — SIGNIFICANT CHANGE UP (ref 8.6–10.2)
CHLORIDE SERPL-SCNC: 101 MMOL/L — SIGNIFICANT CHANGE UP (ref 98–107)
CO2 SERPL-SCNC: 21 MMOL/L — LOW (ref 22–29)
CREAT SERPL-MCNC: 0.55 MG/DL — SIGNIFICANT CHANGE UP (ref 0.5–1.3)
ELLIPTOCYTES BLD QL SMEAR: SLIGHT — SIGNIFICANT CHANGE UP
EOSINOPHIL # BLD AUTO: 0.2 K/UL — SIGNIFICANT CHANGE UP (ref 0–0.5)
EOSINOPHIL NFR BLD AUTO: 1.7 % — SIGNIFICANT CHANGE UP (ref 0–6)
GLUCOSE SERPL-MCNC: 95 MG/DL — SIGNIFICANT CHANGE UP (ref 70–99)
HCT VFR BLD CALC: 35 % — SIGNIFICANT CHANGE UP (ref 34.5–45)
HGB BLD-MCNC: 10.9 G/DL — LOW (ref 11.5–15.5)
HYPOCHROMIA BLD QL: SLIGHT — SIGNIFICANT CHANGE UP
LYMPHOCYTES # BLD AUTO: 1.21 K/UL — SIGNIFICANT CHANGE UP (ref 1–3.3)
LYMPHOCYTES # BLD AUTO: 10.5 % — LOW (ref 13–44)
MACROCYTES BLD QL: SLIGHT — SIGNIFICANT CHANGE UP
MANUAL SMEAR VERIFICATION: SIGNIFICANT CHANGE UP
MCHC RBC-ENTMCNC: 29.7 PG — SIGNIFICANT CHANGE UP (ref 27–34)
MCHC RBC-ENTMCNC: 31.1 GM/DL — LOW (ref 32–36)
MCV RBC AUTO: 95.4 FL — SIGNIFICANT CHANGE UP (ref 80–100)
MONOCYTES # BLD AUTO: 0.3 K/UL — SIGNIFICANT CHANGE UP (ref 0–0.9)
MONOCYTES NFR BLD AUTO: 2.6 % — SIGNIFICANT CHANGE UP (ref 2–14)
MYELOCYTES NFR BLD: 0.9 % — HIGH (ref 0–0)
NEUTROPHILS # BLD AUTO: 9.55 K/UL — HIGH (ref 1.8–7.4)
NEUTROPHILS NFR BLD AUTO: 80.7 % — HIGH (ref 43–77)
NEUTS BAND # BLD: 1.8 % — SIGNIFICANT CHANGE UP (ref 0–8)
OVALOCYTES BLD QL SMEAR: SLIGHT — SIGNIFICANT CHANGE UP
PLAT MORPH BLD: NORMAL — SIGNIFICANT CHANGE UP
PLATELET # BLD AUTO: 350 K/UL — SIGNIFICANT CHANGE UP (ref 150–400)
POIKILOCYTOSIS BLD QL AUTO: SLIGHT — SIGNIFICANT CHANGE UP
POLYCHROMASIA BLD QL SMEAR: SLIGHT — SIGNIFICANT CHANGE UP
POTASSIUM SERPL-MCNC: 4.3 MMOL/L — SIGNIFICANT CHANGE UP (ref 3.5–5.3)
POTASSIUM SERPL-SCNC: 4.3 MMOL/L — SIGNIFICANT CHANGE UP (ref 3.5–5.3)
PROT SERPL-MCNC: 7.4 G/DL — SIGNIFICANT CHANGE UP (ref 6.6–8.7)
RBC # BLD: 3.67 M/UL — LOW (ref 3.8–5.2)
RBC # FLD: 14.3 % — SIGNIFICANT CHANGE UP (ref 10.3–14.5)
RBC BLD AUTO: ABNORMAL
SCHISTOCYTES BLD QL AUTO: SLIGHT — SIGNIFICANT CHANGE UP
SODIUM SERPL-SCNC: 137 MMOL/L — SIGNIFICANT CHANGE UP (ref 135–145)
VARIANT LYMPHS # BLD: 0.9 % — SIGNIFICANT CHANGE UP (ref 0–6)
WBC # BLD: 11.57 K/UL — HIGH (ref 3.8–10.5)
WBC # FLD AUTO: 11.57 K/UL — HIGH (ref 3.8–10.5)

## 2020-02-10 PROCEDURE — 99218: CPT | Mod: 25

## 2020-02-10 PROCEDURE — 70450 CT HEAD/BRAIN W/O DYE: CPT | Mod: 26

## 2020-02-10 PROCEDURE — 99283 EMERGENCY DEPT VISIT LOW MDM: CPT | Mod: 57

## 2020-02-10 PROCEDURE — 72110 X-RAY EXAM L-2 SPINE 4/>VWS: CPT | Mod: 26

## 2020-02-10 PROCEDURE — 12001 RPR S/N/AX/GEN/TRNK 2.5CM/<: CPT

## 2020-02-10 PROCEDURE — 72125 CT NECK SPINE W/O DYE: CPT | Mod: 26

## 2020-02-10 PROCEDURE — 22310 CLOSED TX VERT FX W/O MANJ: CPT

## 2020-02-10 RX ORDER — ACETAMINOPHEN 500 MG
650 TABLET ORAL ONCE
Refills: 0 | Status: COMPLETED | OUTPATIENT
Start: 2020-02-10 | End: 2020-02-10

## 2020-02-10 RX ORDER — LATANOPROST 0.05 MG/ML
1 SOLUTION/ DROPS OPHTHALMIC; TOPICAL AT BEDTIME
Refills: 0 | Status: DISCONTINUED | OUTPATIENT
Start: 2020-02-10 | End: 2020-02-17

## 2020-02-10 RX ORDER — FERROUS SULFATE 325(65) MG
325 TABLET ORAL DAILY
Refills: 0 | Status: DISCONTINUED | OUTPATIENT
Start: 2020-02-10 | End: 2020-02-17

## 2020-02-10 RX ORDER — PANTOPRAZOLE SODIUM 20 MG/1
40 TABLET, DELAYED RELEASE ORAL
Refills: 0 | Status: DISCONTINUED | OUTPATIENT
Start: 2020-02-10 | End: 2020-02-17

## 2020-02-10 RX ORDER — FOLIC ACID 0.8 MG
1 TABLET ORAL DAILY
Refills: 0 | Status: DISCONTINUED | OUTPATIENT
Start: 2020-02-10 | End: 2020-02-17

## 2020-02-10 RX ORDER — ALPRAZOLAM 0.25 MG
0.5 TABLET ORAL THREE TIMES A DAY
Refills: 0 | Status: COMPLETED | OUTPATIENT
Start: 2020-02-10 | End: 2020-02-17

## 2020-02-10 RX ORDER — TETANUS TOXOID, REDUCED DIPHTHERIA TOXOID AND ACELLULAR PERTUSSIS VACCINE, ADSORBED 5; 2.5; 8; 8; 2.5 [IU]/.5ML; [IU]/.5ML; UG/.5ML; UG/.5ML; UG/.5ML
0.5 SUSPENSION INTRAMUSCULAR ONCE
Refills: 0 | Status: COMPLETED | OUTPATIENT
Start: 2020-02-10 | End: 2020-02-10

## 2020-02-10 RX ORDER — ACETAMINOPHEN 500 MG
650 TABLET ORAL EVERY 6 HOURS
Refills: 0 | Status: DISCONTINUED | OUTPATIENT
Start: 2020-02-10 | End: 2020-02-17

## 2020-02-10 RX ORDER — CHOLECALCIFEROL (VITAMIN D3) 125 MCG
1000 CAPSULE ORAL DAILY
Refills: 0 | Status: DISCONTINUED | OUTPATIENT
Start: 2020-02-10 | End: 2020-02-17

## 2020-02-10 RX ORDER — VENLAFAXINE HCL 75 MG
37.5 CAPSULE, EXT RELEASE 24 HR ORAL DAILY
Refills: 0 | Status: DISCONTINUED | OUTPATIENT
Start: 2020-02-10 | End: 2020-02-17

## 2020-02-10 RX ADMIN — Medication 650 MILLIGRAM(S): at 12:09

## 2020-02-10 RX ADMIN — Medication 650 MILLIGRAM(S): at 23:59

## 2020-02-10 RX ADMIN — Medication 650 MILLIGRAM(S): at 13:10

## 2020-02-10 RX ADMIN — TETANUS TOXOID, REDUCED DIPHTHERIA TOXOID AND ACELLULAR PERTUSSIS VACCINE, ADSORBED 0.5 MILLILITER(S): 5; 2.5; 8; 8; 2.5 SUSPENSION INTRAMUSCULAR at 12:08

## 2020-02-10 RX ADMIN — LATANOPROST 1 DROP(S): 0.05 SOLUTION/ DROPS OPHTHALMIC; TOPICAL at 19:44

## 2020-02-10 RX ADMIN — Medication 0.5 MILLIGRAM(S): at 19:44

## 2020-02-10 NOTE — ED PROVIDER NOTE - CLINICAL SUMMARY MEDICAL DECISION MAKING FREE TEXT BOX
87 y/o F pt with hx of HTN and afib who presents today s/p mechanical fall with head injury, neck pain, and back pain. Due to hx of multiple falls, pt was stopped from anticoagulation. Will order CT head and neck with XR of lumbosacral spine. Will give tylenol for pain and labs then reeval.

## 2020-02-10 NOTE — ED CDU PROVIDER INITIAL DAY NOTE - NS ED ROS FT
Gen: denies fever, chills, fatigue, weight loss  Skin: +Laceration. denies rashes, bruising  HEENT: denies visual changes, ear pain, nasal congestion, throat pain  Respiratory: denies BAEZ, SOB, cough, wheezing  Cardiovascular: denies chest pain, palpitations, diaphoresis, LE edema  GI: denies abdominal pain, n/v/d  : denies dysuria, frequency, urgency, bowel/bladder incontinence  MSK: +Back pain, neck pain. denies joint swelling/pain  Neuro: denies headache, dizziness, weakness, numbness  Psych: denies anxiety, depression, SI/HI, visual/auditory hallucinations

## 2020-02-10 NOTE — ED PROVIDER NOTE - PHYSICAL EXAMINATION
Gen: no acute distress  Head: normocephalic, 2.5cm laceration to superior scalp, no active bleeding  Lung: CTAB, no respiratory distress, no wheezing, rales, rhonchi  CV: normal s1/s2, rrr,   Abd: soft, non-tender, non-distended  MSK: No edema, no visible deformities, full range of motion in all 4 extremities, midline c-spine tenderness, midline lumbosacral spine tenderness, no stepoffs  Neuro: No focal neurologic deficits, 5/5 strength to all extremities, sensation intact, CN 2-12 intact   Skin: No rash   Psych: normal affect

## 2020-02-10 NOTE — ED PROVIDER NOTE - ATTENDING CONTRIBUTION TO CARE
Elizabeth: I performed a face to face bedside interview with patient regarding history of present illness, review of symptoms and past medical history. I completed an independent physical exam.  I have discussed patient's plan of care with resident.   I agree with note as stated above including HISTORY OF PRESENT ILLNESS, HIV, PAST MEDICAL/SURGICAL/FAMILY/SOCIAL HISTORY, ALLERGIES AND HOME MEDICATIONS, REVIEW OF SYSTEMS, PHYSICAL EXAM, MEDICAL DECISION MAKING and any PROGRESS NOTES during the time I functioned as the attending physician for this patient unless otherwise noted. My brief assessment is as follows: 86F h/o afib not on ac, HTN, breast cancer presents s/p fall. Transitioned to son's home yesterday, got out of bed this morning and her walker had been moved, fell backwards and hit head on headboard. Endorses headache and back pain. Denies LOC, vomiting, vision changes, additional injuries.   Gen: Well appearing in NAD  Head: NC/AT, 3cm linear well approximated superficial laceration to top of scalp  Neck: trachea midline, mild midline ttp, FROM  Resp:  No distress  Ext: no deformities, sacral ttp. FROM of all extremities, pulses/sensation intact  Neuro:  A&O appears non focal  Skin:  Warm and dry as visualized  Psych:  Normal affect and mood   Pt s/p mechanical fall with head lac and low back pain. Plan for CTH/CT cervical spine, tdap, lac repair, xray LS spine.

## 2020-02-10 NOTE — CONSULT NOTE ADULT - ATTENDING COMMENTS
Attending statement:  I have personally seen this patient, and formed a face to face diagnostic evaluation on this patient on this date.  I have reviewed the PA, NP and or Medical/PA student and/or Resident documentation and agree with the history, physical exam and plan of care except if noted otherwise.     Patient seen on the evening of February 10 at approximately 7 PM. I reviewed CAT scan data of the cervical spine as well as x-rays of the lumbar spine that demonstrated what appears to be acute compression fractures. I believe these are acute pathologic compression fracture secondary to osteoporosis initiation of conservative care and LSO bracing and obtain an MRI has been initiated. Once the LSO brace is at bedside I believe PT OT and weightbearing as tolerated and placement planning can be initiated

## 2020-02-10 NOTE — ED CDU PROVIDER INITIAL DAY NOTE - OBJECTIVE STATEMENT
85yo F pmhx GERD, dementia, HTN, afib (not on AC), frequent falls presented to ED s/p fall at home. As per son at bedside pt was getting out of bed and reaching for walker but fell backward, hitting top of her head on night stand. No LOC, not on AC. In ED, pt complained of neck and back pain, CT head and c-spine negative, xray L spine showed collapse of T11, T12 and L4. Pt also noted to have lac to scalp, repaired in ED. Denies fever, chills, syncope, CP, SOB, palpitations, LE edema, cough, abdominal pain, n/v/d, melena.

## 2020-02-10 NOTE — ED PROVIDER NOTE - OBJECTIVE STATEMENT
87 y/o F pt with hx of HTN and afib not currently on anticoagulation due to multiple falls who presents today s/p mechanical fall today at home. Pt states that she was getting out of bed when she fell while reaching for her head. Pt states that she hit her head on the nightstand, no LOC. Is currently c/o headache, neck pain, and lower back pain. Pt denies any chest pain, dyspnea, fevers, n/v/d, abdominal pain, dysuria, cough, congestion, sore throat, weakness, numbness, tingling, dizziness, syncope, or other complaint. Last tetanus unknown.

## 2020-02-10 NOTE — ED CDU PROVIDER INITIAL DAY NOTE - PHYSICAL EXAMINATION
Const: In no acute distress. Frail appearing female  HEENT: NC/AT. Moist mucous membranes.  Eyes: No scleral icterus. EOMI.  Neck:. Soft and supple. Full ROM without pain. Paracervical ttp b/l  Cardiac: Irregular rate and rhythm. +S1/S2. Peripheral pulses 2+ and symmetric.   Resp: Speaking in full sentences. No evidence of respiratory distress. No wheezes, rales or rhonchi.  Abd: Soft, non-tender, non-distended. Normal bowel sounds in all 4 quadrants. No guarding or rebound.  Back: Spine midline and non-tender, no step offs. No CVAT. TTP paraspinal lumbar region  MSK: No edema, no visible deformities, full range of motion in all 4 extremities  Neuro: No focal neurologic deficits, 5/5 strength to all extremities, sensation intact, CN 2-12 intact   Skin: +Laceration to scalp, repaired with staples. No rashes, abrasions

## 2020-02-10 NOTE — CONSULT NOTE ADULT - SUBJECTIVE AND OBJECTIVE BOX
Pt Name: ADRIÁN MARTE    MRN: [id number]      Patient is a 86y Female presenting to the emergency department with a chief complaint of fall. Pt says she fell while getting out of bed and landed on her back thi morning, She also hit her head on the headboard and has a wound that was treated and covered by ED. She complains of mid and low back pain. She normally ambulates with a walker. Denies weakness or numbness in extremities. No pain in extremities. No loss of bladder or bowel control. No other complaints.    HEALTH ISSUES - PROBLEM Dx:  T11, T12, L4 compression      REVIEW OF SYSTEMS    General:	No fevers/chills. No LOC    Skin/Breast: No rash  	  Respiratory and Thorax: No SOB  	  Cardiovascular:	No CP    Gastrointestinal:	No nausea    Genitourinary:	No incontinence    Musculoskeletal:	 See HPI    Neurological:	See HPI	    ROS is otherwise negative.    PAST MEDICAL & SURGICAL HISTORY:  PAST MEDICAL & SURGICAL HISTORY:  Closed fracture of lower extremity, unspecified laterality, initial encounter  Fall, initial encounter  Breast cancer  Hypertension  Atrial fibrillation  S/P partial mastectomy, right      Allergies: No Known Allergies      Medications: acetaminophen   Tablet .. 650 milliGRAM(s) Oral every 6 hours PRN  ALPRAZolam 0.5 milliGRAM(s) Oral three times a day  cholecalciferol 1000 Unit(s) Oral daily  ferrous    sulfate 325 milliGRAM(s) Oral daily  folic acid 1 milliGRAM(s) Oral daily  latanoprost 0.005% Ophthalmic Solution 1 Drop(s) Both EYES at bedtime  pantoprazole    Tablet 40 milliGRAM(s) Oral before breakfast  venlafaxine 37.5 milliGRAM(s) Oral daily      FAMILY HISTORY:  No family history of disorders  : non-contributory    Social History:     Ambulation: Walking independently [ ] With Cane [ ] With Walker [X ]  Bedbound [ ]                           10.9   11.57 )-----------( 350      ( 10 Feb 2020 11:38 )             35.0     02-10    137  |  101  |  15.0  ----------------------------<  95  4.3   |  21.0<L>  |  0.55    Ca    9.2      10 Feb 2020 11:38    TPro  7.4  /  Alb  3.4  /  TBili  0.2<L>  /  DBili  x   /  AST  33<H>  /  ALT  13  /  AlkPhos  86  02-10      PHYSICAL EXAM:    Vital Signs Last 24 Hrs  T(C): 37.4 (10 Feb 2020 15:30), Max: 37.4 (10 Feb 2020 15:30)  T(F): 99.3 (10 Feb 2020 15:30), Max: 99.3 (10 Feb 2020 15:30)  HR: 94 (10 Feb 2020 15:30) (78 - 94)  BP: 113/73 (10 Feb 2020 15:30) (111/69 - 113/73)  BP(mean): --  RR: 18 (10 Feb 2020 15:30) (18 - 18)  SpO2: 94% (10 Feb 2020 15:30) (94% - 94%)  Daily Height in cm: 165.1 (10 Feb 2020 10:21)    Daily     Appearance: Alert, responsive, in no acute distress.  Skin: no rash on visible skin. Skin is clean, dry and intact. No bleeding. No abrasions. No ulcerations.  Vascular: 2+ distal pulses. Cap refill < 2 sec.  No extremity ulcerations. No cyanosis.  Musculoskeletal:       Back: +kyphosis. NTTP. Skin intact. No ecchymosis or erythmea     Left Upper Extremity: Atraumatic with normal alignment NROM. No crepitus. No bony tenderness.      Right Upper Extremity: Atraumatic with normal alignment NROM. No crepitus. No bony tenderness.      Left Lower Extremity: Atraumatic with normal alignment NROM. No crepitus. No bony tenderness.      Right Lower Extremity: Atraumatic with normal alignment NROM. No crepitus. No bony tenderness.   Neurological: Sensation is grossly intact to light touch. No focal deficits or weaknesses found.  Pathologic reflexes: [-] Oliva,  [- ]  Clonus           Motor exam:           Upper extremity              Bi(c5)  WE(c6)  EE(c7)   FF(c8)                                      R         5/5        5/5        5/5       5/5                                     L          5/5        5/5        5/5       5/5          Lower extremity          HF(l2)   KE(l3)    TA(l4)   EHL(l5)  GS(s1)                                     R        5/5        5/5        5/5       5/5         5/5                                   L         5/5        5/5       5/5       5/5          5/5    Imaging Studies:  < from: Xray Lumbosacral Spine (02.10.20 @ 12:09) >     EXAM:  LUMBO-SACRAL SPINE                          PROCEDURE DATE:  02/10/2020          INTERPRETATION:  Lumbosacral spine:     HISTORY: Low back pain after falling this morning    Two views of the lumbosacral spine reveals normal alignment and curvature. The vertebral bodies show mild wedging of L4 with more extensive fracture of T11 and T12. The pedicles, spinous processes and transverse processes and sacroiliac joints appear unremarkable. The disc spaces are maintained.    IMPRESSION:   Mild collapse, L4. Greater collapse, T11 and T12.     < end of copied text >      A/P:  Pt is a  86y Female with T11, T12, L4 compression fxs      PLAN:  * Pain control  * MRI ordered  * Likely bracing with TLSO  * Treatment plan to be finalized after review of pending imaging studies  D/W Dr Deal

## 2020-02-10 NOTE — ED ADULT NURSE NOTE - NSIMPLEMENTINTERV_GEN_ALL_ED
Implemented All Fall with Harm Risk Interventions:  [place] to call system. Call bell, personal items and telephone within reach. Instruct patient to call for assistance. Room bathroom lighting operational. Non-slip footwear when patient is off stretcher. Physically safe environment: no spills, clutter or unnecessary equipment. Stretcher in lowest position, wheels locked, appropriate side rails in place. Provide visual cue, wrist band, yellow gown, etc. Monitor gait and stability. Monitor for mental status changes and reorient to person, place, and time. Review medications for side effects contributing to fall risk. Reinforce activity limits and safety measures with patient and family. Provide visual clues: red socks.

## 2020-02-10 NOTE — ED ADULT TRIAGE NOTE - CHIEF COMPLAINT QUOTE
"I got out of bed for the bathroom and tripped and fell backwards" "hit my head on the table" c/o witnessed fall by son this AM, +hit back of head on night stand. +abrasion noted to head, bleeding controlled@ this time. Patient is baseline ms @ this time per family. Reports low back pain denies neck pain denies cp denies syncope. appears comfortable on stretcher,MAEx4

## 2020-02-10 NOTE — ED ADULT NURSE NOTE - NSSEPSISSUSPECTED_ED_A_ED
UV Treatment Record      Patient Information  Phototherapy orders per Dr. Quinones  Diagnosis: psoriasis  Treatment:UVB  Skin Type: II  Treatment order 1: full body  Treatment order 2:     Treatment Information Area 1   Treatment #: 10  Date: 2017  Joules:   Chrissie-joules: 365  Mins: 1:21  CUM: 2,825  Special Shields: face shield, goggles, theraplex and zinc oxide    Reaction to Treatment Area 1  Red: 0 - None  Tender/Itchin - None  No new medications     No

## 2020-02-10 NOTE — ED PROVIDER NOTE - PROGRESS NOTE DETAILS
Discussed pt's case with pt and son as well as need for MRI. Pt and son agree with plan. Discussed pt's case with Ortho, requesting MRI. Pt and son updated on results as well as need for MRI. Pt and son agree with plan.

## 2020-02-11 LAB
APPEARANCE UR: ABNORMAL
BACTERIA # UR AUTO: ABNORMAL
BILIRUB UR-MCNC: NEGATIVE — SIGNIFICANT CHANGE UP
COLOR SPEC: YELLOW — SIGNIFICANT CHANGE UP
DIFF PNL FLD: ABNORMAL
EPI CELLS # UR: SIGNIFICANT CHANGE UP
GLUCOSE UR QL: NEGATIVE MG/DL — SIGNIFICANT CHANGE UP
KETONES UR-MCNC: NEGATIVE — SIGNIFICANT CHANGE UP
LEUKOCYTE ESTERASE UR-ACNC: ABNORMAL
NITRITE UR-MCNC: NEGATIVE — SIGNIFICANT CHANGE UP
PH UR: 7 — SIGNIFICANT CHANGE UP (ref 5–8)
PROT UR-MCNC: 15 MG/DL
RBC CASTS # UR COMP ASSIST: ABNORMAL /HPF (ref 0–4)
SP GR SPEC: 1 — LOW (ref 1.01–1.02)
UROBILINOGEN FLD QL: NEGATIVE MG/DL — SIGNIFICANT CHANGE UP
WBC UR QL: >50

## 2020-02-11 PROCEDURE — 99226: CPT

## 2020-02-11 RX ORDER — SODIUM CHLORIDE 9 MG/ML
1000 INJECTION INTRAMUSCULAR; INTRAVENOUS; SUBCUTANEOUS ONCE
Refills: 0 | Status: COMPLETED | OUTPATIENT
Start: 2020-02-11 | End: 2020-02-11

## 2020-02-11 RX ORDER — CEFTRIAXONE 500 MG/1
1000 INJECTION, POWDER, FOR SOLUTION INTRAMUSCULAR; INTRAVENOUS EVERY 24 HOURS
Refills: 0 | Status: DISCONTINUED | OUTPATIENT
Start: 2020-02-11 | End: 2020-02-17

## 2020-02-11 RX ORDER — ASPIRIN/CALCIUM CARB/MAGNESIUM 324 MG
325 TABLET ORAL DAILY
Refills: 0 | Status: DISCONTINUED | OUTPATIENT
Start: 2020-02-11 | End: 2020-02-17

## 2020-02-11 RX ORDER — SODIUM CHLORIDE 9 MG/ML
1000 INJECTION INTRAMUSCULAR; INTRAVENOUS; SUBCUTANEOUS
Refills: 0 | Status: DISCONTINUED | OUTPATIENT
Start: 2020-02-11 | End: 2020-02-17

## 2020-02-11 RX ADMIN — Medication 37.5 MILLIGRAM(S): at 14:15

## 2020-02-11 RX ADMIN — CEFTRIAXONE 100 MILLIGRAM(S): 500 INJECTION, POWDER, FOR SOLUTION INTRAMUSCULAR; INTRAVENOUS at 08:00

## 2020-02-11 RX ADMIN — Medication 325 MILLIGRAM(S): at 21:32

## 2020-02-11 RX ADMIN — PANTOPRAZOLE SODIUM 40 MILLIGRAM(S): 20 TABLET, DELAYED RELEASE ORAL at 05:21

## 2020-02-11 RX ADMIN — LATANOPROST 1 DROP(S): 0.05 SOLUTION/ DROPS OPHTHALMIC; TOPICAL at 21:32

## 2020-02-11 RX ADMIN — SODIUM CHLORIDE 1000 MILLILITER(S): 9 INJECTION INTRAMUSCULAR; INTRAVENOUS; SUBCUTANEOUS at 08:27

## 2020-02-11 RX ADMIN — SODIUM CHLORIDE 100 MILLILITER(S): 9 INJECTION INTRAMUSCULAR; INTRAVENOUS; SUBCUTANEOUS at 14:15

## 2020-02-11 RX ADMIN — Medication 1 MILLIGRAM(S): at 14:14

## 2020-02-11 RX ADMIN — Medication 1000 UNIT(S): at 14:15

## 2020-02-11 RX ADMIN — Medication 650 MILLIGRAM(S): at 01:00

## 2020-02-11 RX ADMIN — CEFTRIAXONE 1000 MILLIGRAM(S): 500 INJECTION, POWDER, FOR SOLUTION INTRAMUSCULAR; INTRAVENOUS at 08:27

## 2020-02-11 RX ADMIN — SODIUM CHLORIDE 1000 MILLILITER(S): 9 INJECTION INTRAMUSCULAR; INTRAVENOUS; SUBCUTANEOUS at 13:26

## 2020-02-11 RX ADMIN — Medication 325 MILLIGRAM(S): at 14:15

## 2020-02-11 RX ADMIN — Medication 0.5 MILLIGRAM(S): at 21:32

## 2020-02-11 RX ADMIN — Medication 0.5 MILLIGRAM(S): at 05:20

## 2020-02-11 NOTE — ED CDU PROVIDER SUBSEQUENT DAY NOTE - PROGRESS NOTE DETAILS
PT signed out to JESSICA townsend after lengthy discussion about case with JESSICA STOVER  Pt seen resting comfortable in no acute distress in bed, no acute complaints will follow CDU initial intake orders observe for change in pt condition, results and or consults.

## 2020-02-11 NOTE — ED CDU PROVIDER SUBSEQUENT DAY NOTE - MEDICAL DECISION MAKING DETAILS
86y female with T11, T12, L4 compression fx s/p fall. Placed in CDU for MRI. Spine aware and following; per recs, likely to require TLSO brace. Additionally patient began febrile overnight without other sxms, pending UA.

## 2020-02-11 NOTE — PROGRESS NOTE ADULT - SUBJECTIVE AND OBJECTIVE BOX
ADRIÁN [id number]    86yFemale  No family history of disorders  MEWS Score  Closed fracture of lower extremity, unspecified laterality, initial encounter  Fall, initial encounter  Breast cancer  Hypertension  Atrial fibrillation  Compression fracture of spine  S/P partial mastectomy, right  S/P mastectomy, left  FELL HIT HEAD  90+    SUBJECTIVE: Patient currently being followed for T11/12, L4 compression fractures. Patient seen and examined lying comfortable in bed. Pain controlled. Patient denies acute motor or sensory changes. Denies saddle anesthesia. Denies bowel or bladder symptoms. currently awaiting MRI of T/L spine.     OBJECTIVE:   Vital Signs Last 24 Hrs  T(C): 36.6 (11 Feb 2020 15:09), Max: 38.2 (10 Feb 2020 23:48)  T(F): 97.8 (11 Feb 2020 15:09), Max: 100.7 (10 Feb 2020 23:48)  HR: 83 (11 Feb 2020 15:09) (75 - 89)  BP: 117/76 (11 Feb 2020 15:09) (99/63 - 117/76)  BP(mean): --  RR: 18 (11 Feb 2020 15:09) (18 - 18)  SpO2: 97% (11 Feb 2020 15:09) (95% - 97%)\par     Constitutional: Pleasant in no acute distress  Spine:   Sensation:          [ ] Lower extremity             sp         dp         saph       colvin         tibial                                                R          +           +             +           +          +                                               L           +           +             +           +          +             [ ] Lower extremity          HF(l2)   KE(l3)    TA(l4)   EHL(l5)  GS(s1)                                                 R        5/5        5/5        5/5       5/5         5/5                                               L         5/5        5/5       5/5       5/5          5/5                                                       DP pulse 2+ b/l. Calves soft NT b/l.         LABS:                        10.9   11.57 )-----------( 350      ( 10 Feb 2020 11:38 )             35.0         A/P:  Pt is a  86y Female with T11, T12, L4 compression fxs      PLAN:  * Pain control  * MRI ordered  * LSO brace ordered   * Treatment plan to be finalized after review of pending imaging studies

## 2020-02-11 NOTE — ED ADULT NURSE REASSESSMENT NOTE - INTERVENTIONS DEFINITIONS
[place] to call system/Instruct patient to call for assistance/Stretcher in lowest position, wheels locked, appropriate side rails in place/Physically safe environment: no spills, clutter or unnecessary equipment/Call bell, personal items and telephone within reach

## 2020-02-12 VITALS
HEART RATE: 82 BPM | DIASTOLIC BLOOD PRESSURE: 66 MMHG | OXYGEN SATURATION: 95 % | TEMPERATURE: 98 F | RESPIRATION RATE: 18 BRPM | SYSTOLIC BLOOD PRESSURE: 104 MMHG

## 2020-02-12 DIAGNOSIS — M48.50XA COLLAPSED VERTEBRA, NOT ELSEWHERE CLASSIFIED, SITE UNSPECIFIED, INITIAL ENCOUNTER FOR FRACTURE: ICD-10-CM

## 2020-02-12 LAB
CULTURE RESULTS: SIGNIFICANT CHANGE UP
SPECIMEN SOURCE: SIGNIFICANT CHANGE UP

## 2020-02-12 PROCEDURE — 96372 THER/PROPH/DIAG INJ SC/IM: CPT | Mod: XU

## 2020-02-12 PROCEDURE — 90715 TDAP VACCINE 7 YRS/> IM: CPT

## 2020-02-12 PROCEDURE — 85027 COMPLETE CBC AUTOMATED: CPT

## 2020-02-12 PROCEDURE — 96368 THER/DIAG CONCURRENT INF: CPT

## 2020-02-12 PROCEDURE — 70450 CT HEAD/BRAIN W/O DYE: CPT

## 2020-02-12 PROCEDURE — 72146 MRI CHEST SPINE W/O DYE: CPT

## 2020-02-12 PROCEDURE — 36415 COLL VENOUS BLD VENIPUNCTURE: CPT

## 2020-02-12 PROCEDURE — 72110 X-RAY EXAM L-2 SPINE 4/>VWS: CPT

## 2020-02-12 PROCEDURE — 90471 IMMUNIZATION ADMIN: CPT

## 2020-02-12 PROCEDURE — 87086 URINE CULTURE/COLONY COUNT: CPT

## 2020-02-12 PROCEDURE — 96365 THER/PROPH/DIAG IV INF INIT: CPT

## 2020-02-12 PROCEDURE — 80053 COMPREHEN METABOLIC PANEL: CPT

## 2020-02-12 PROCEDURE — 12001 RPR S/N/AX/GEN/TRNK 2.5CM/<: CPT

## 2020-02-12 PROCEDURE — 99217: CPT

## 2020-02-12 PROCEDURE — 72125 CT NECK SPINE W/O DYE: CPT

## 2020-02-12 PROCEDURE — 72148 MRI LUMBAR SPINE W/O DYE: CPT

## 2020-02-12 PROCEDURE — 87040 BLOOD CULTURE FOR BACTERIA: CPT

## 2020-02-12 PROCEDURE — 96361 HYDRATE IV INFUSION ADD-ON: CPT

## 2020-02-12 PROCEDURE — 81001 URINALYSIS AUTO W/SCOPE: CPT

## 2020-02-12 PROCEDURE — 99284 EMERGENCY DEPT VISIT MOD MDM: CPT | Mod: 25

## 2020-02-12 PROCEDURE — 72146 MRI CHEST SPINE W/O DYE: CPT | Mod: 26

## 2020-02-12 PROCEDURE — 72148 MRI LUMBAR SPINE W/O DYE: CPT | Mod: 26

## 2020-02-12 PROCEDURE — G0378: CPT

## 2020-02-12 RX ORDER — ENOXAPARIN SODIUM 100 MG/ML
40 INJECTION SUBCUTANEOUS DAILY
Refills: 0 | Status: DISCONTINUED | OUTPATIENT
Start: 2020-02-12 | End: 2020-02-17

## 2020-02-12 RX ORDER — CEPHALEXIN 500 MG
1 CAPSULE ORAL
Qty: 14 | Refills: 0
Start: 2020-02-12 | End: 2020-02-18

## 2020-02-12 RX ADMIN — Medication 650 MILLIGRAM(S): at 10:47

## 2020-02-12 RX ADMIN — SODIUM CHLORIDE 1000 MILLILITER(S): 9 INJECTION INTRAMUSCULAR; INTRAVENOUS; SUBCUTANEOUS at 01:48

## 2020-02-12 RX ADMIN — ENOXAPARIN SODIUM 40 MILLIGRAM(S): 100 INJECTION SUBCUTANEOUS at 10:52

## 2020-02-12 RX ADMIN — Medication 650 MILLIGRAM(S): at 10:12

## 2020-02-12 RX ADMIN — Medication 1 MILLIGRAM(S): at 10:04

## 2020-02-12 RX ADMIN — Medication 1000 UNIT(S): at 10:04

## 2020-02-12 RX ADMIN — Medication 0.5 MILLIGRAM(S): at 14:29

## 2020-02-12 RX ADMIN — CEFTRIAXONE 100 MILLIGRAM(S): 500 INJECTION, POWDER, FOR SOLUTION INTRAMUSCULAR; INTRAVENOUS at 08:18

## 2020-02-12 RX ADMIN — Medication 0.5 MILLIGRAM(S): at 05:38

## 2020-02-12 RX ADMIN — CEFTRIAXONE 1000 MILLIGRAM(S): 500 INJECTION, POWDER, FOR SOLUTION INTRAMUSCULAR; INTRAVENOUS at 09:08

## 2020-02-12 RX ADMIN — Medication 37.5 MILLIGRAM(S): at 10:05

## 2020-02-12 RX ADMIN — Medication 325 MILLIGRAM(S): at 10:04

## 2020-02-12 RX ADMIN — Medication 650 MILLIGRAM(S): at 16:21

## 2020-02-12 RX ADMIN — PANTOPRAZOLE SODIUM 40 MILLIGRAM(S): 20 TABLET, DELAYED RELEASE ORAL at 05:38

## 2020-02-12 NOTE — PROVIDER CONTACT NOTE (OTHER) - ASSESSMENT
Chart reviewed, contents noted. Pt unable to be seen at this time secondary to pt asked to come back in an hr secondary to her eating and she is a slow eater. Douglas made aware, another PT will pick her up. Thank You
Chart reviewed, contents noted. Pt unable to be seen at this time secondary to PT spoke with Teresa: PA, TLSO has not arrived at bedside yet, and MRI is still pending. PT will hold off until TLSO arrives and MRI is performed and pt cleared for functional mobility. PT will reattempt later in day if schedule permits. Thank You
CCC met with pt. and son Raymundo at bedside, pt. is requesting home care and does not want to be sent to any facility. Pt. and son is informed that a home care referral will be sent to their preferred [facility] and pt. can be discharged when medically ready.
CM SPOKE TO SON VIA PHONE.  PT RESIDES IN A MOTHER / DAUGHTER W/ HER SON.  SON IS THE PRIMARY CG. STATES HE BATHES AND DRESSES PT.  PER CONVERSATION, PT IS AT BASELINE.  MRI / TLSO / PT EVALS REMAIN PENDING.  SUSPECT CHHA.  CHOICE LIST GIVEN.  PT CHOOSING [facility], REFERRAL SENT VIA AC.  SOC REQ FOR 2/13.  ANTIC DC 2/12 - PENDING RESULTS OF TESTING
[facility] has accepted the pt. with SOC 2/13/2020.  Pt. will be assessed for skilled nursing/SW/PT/HHA.  Son Raymundo is informed of the same.  Pt. can be discharged from CM standpoint.
Chart reviewed, MD orders received. Per RN Juan, pt clear for mobilization. Pt received lying in stretcher, NAD, Pt A&O x4 and agreeable to PT evaluation. TLSO donned when OOB. Please see full evaluation note for detail. Pt c/o 0/10 pain during session. Pt left lying in stretcher, NAD, all lines/devices intact and CBWR. RN aware. PT will not follow.

## 2020-02-12 NOTE — ED ADULT NURSE REASSESSMENT NOTE - NSIMPLEMENTINTERV_GEN_ALL_ED
Implemented All Universal Safety Interventions:  [place] to call system. Call bell, personal items and telephone within reach. Instruct patient to call for assistance. Room bathroom lighting operational. Non-slip footwear when patient is off stretcher. Physically safe environment: no spills, clutter or unnecessary equipment. Stretcher in lowest position, wheels locked, appropriate side rails in place.
Specific interventions were implemented:

## 2020-02-12 NOTE — PHYSICAL THERAPY INITIAL EVALUATION ADULT - GENERAL OBSERVATIONS, REHAB EVAL
Patient received lying in stretcher, NAD, breathing RA, TLSO at bedside, worn when OOB. Son present. Pt agreeable to Physical Therapy evaluation.

## 2020-02-12 NOTE — PROGRESS NOTE ADULT - ASSESSMENT
Pt. requires TLSO with rigid posterior panel, interface socks.  Device to limit ROM, support spine, support Fx, reduce discomfort, promote healing.  Socks to prevent skin breakdown.

## 2020-02-12 NOTE — ED CDU PROVIDER DISPOSITION NOTE - NSFOLLOWUPINSTRUCTIONS_ED_ALL_ED_FT
1) WEAR TLSO BRACE WHENEVER YOU ARE OUT OF BED, DO NOT WEAR IT IN BED  2) YOU HAVE A URINARY TRACT INFECTION, CONTINUE KEFLEX 2X/DAY FOR 5 DAYS  3) TYLENOL FOR BACK PAIN AS NEEDED  4) YOU HAVE STAPLES IN YOUR SCALP.  WASH GENTLY AROUND THE AREA.  COME BACK TO THE ED OR TO YOUR MEDICAL DOCTOR IN 5 DAYS FOR REMOVAL  5) SEE DR ULLOA IN 1 WEEK FOR FOLLOW UP

## 2020-02-12 NOTE — ED ADULT NURSE REASSESSMENT NOTE - GENERAL PATIENT STATE
comfortable appearance
comfortable appearance/cooperative/family/SO at bedside/smiling/interactive/resting/sleeping
comfortable appearance/cooperative/improvement verbalized/smiling/interactive/resting/sleeping/family/SO at bedside
comfortable appearance/cooperative/resting/sleeping/smiling/interactive
smiling/interactive/cooperative/resting/sleeping/comfortable appearance/family/SO at bedside
cooperative/comfortable appearance

## 2020-02-12 NOTE — ED CDU PROVIDER SUBSEQUENT DAY NOTE - NS ED ROS FT
Gen: denies fever, chills, fatigue, weight loss  Skin: +Laceration. denies rashes, bruising  HEENT: denies visual changes, ear pain, nasal congestion, throat pain  Respiratory: denies BAEZ, SOB, cough, wheezing  Cardiovascular: denies chest pain, palpitations, diaphoresis, LE edema  GI: denies abdominal pain, n/v/d  : denies dysuria, frequency, urgency, bowel/bladder incontinence  MSK: +Back pain, neck pain. denies joint swelling/pain  Neuro: denies headache, dizziness, weakness, numbness  Psych: denies anxiety, depression, SI/HI, visual/auditory hallucinations
ROS: CONTUSIONAL: Denies fever, chills, fatigue, wt loss. HEAD: Denies trauma, HA, Dizziness. EYE: Denies Acute visual changes, diplopia. ENMT: Denies change in hearing, tinnitus, epistaxis, difficulty swallowing, sore throat. CARDIO: Denies CP, palpitations, edema. RESP: Denies Cough, SOB , Diff breathing, hemoptysis. GI: Denies N/V, ABD pain, change in bowel movement. URINARY: Denies difficulty urinating, pelvic pain. MS:  Denies joint pain,weakness, decreased ROM, swelling. NEURO: Denies change in gait, seizures, loss of sensation, dizziness, confusion LOC.  PSY: NO SI/HI.

## 2020-02-12 NOTE — ED CDU PROVIDER SUBSEQUENT DAY NOTE - HISTORY
PT placed in OBS, has had no acute incidents or complaints while in CDU PT is stable. PT Placed in OBS for possible acute spinal FX awaiting MR and Brace will continue  to monitor.

## 2020-02-12 NOTE — ED ADULT NURSE REASSESSMENT NOTE - NS ED NURSE REASSESS COMMENT FT1
Care endorsed to RIP Cruz. Patient pending MRI testing. Son present at the bedside. Patient in NAD. RN with no further questions.
MD. Fernandez at bedside suturing up patient's head.
Pt alert and oriented, no apparent distress noted at this time. Pt handed off to Oswald ERWIN in stable condition.
RN contacted by MRI technician regarding possible MRI exam tonight. pt with known hx of dementia at baseline AOx2 unable to complete MRI checklist. JESSICA Mustafa made aware, day RN to contact family for checklist answers. will endorse to day RN.
This RN covering primary RN break, at this time patient is resting in bed with comfortable appearance.  Medicated as ordered.  Pt updated on plan of care.
assuemed care of pt @ 1930,report received from Tawny ERWIN, charting as noted. pt AOx4 in NAD, Vital Signs Stable. pt denies any pain at this time. awaiting MRI of spine. pt c/o feeling tired, wishes to take medication and go to sleep. pt states "if they don't do the MRI soon then I want to reschedule it for the morning because I'm getting tired." PM medications administered upon pt request. fall precautions in place: stretcher locked in lowest position,call bell in reach, side rails up, non skid socks on. RN rounding frequently to maintain safety.
assumed care of pt @ 1930, report received from Kenny ERWIN, charting as noted. pt AOx2, at baseline mental status. Vital Signs Stable. pt denies any pain at this time. staples on head intact, no bleeding or drainage noted, site clean dry and intact. pt awaiting MRI and PT consult. pt assisted on bedpan, normal bowel movement. marquez care and linen change completed. permifit in place for incontinence maintenance. Fall precautions in place: stretcher locked in lowest position, call bell in reach, side rails up x2, non skid socks on. RN rounding frequently.
pt incontinent of urine overnight. bladder scan done at bedside, resulted 256 mL. straight cath completed, urine sent to lab. pt tolerated well.
verbal report received from CDU RN Nancy, assumed care of pt, no obvious distress noted
Assumed care of the patient at 1615. Verbal report received from RIP Shearer ED. Patient transferred to observation unit CDU 11. Patient A&Ox4. No s/s of distress. Dressing present to scalp, no active bleeding noted. Patient denies any blurry VA/HA/numbness or tingling. States mild neck stiffness persists. VSS. Full ROM+. Meal tray provided. Patient pending MRI testing. Patient in understanding of plan of care. Patient with no further questions for the RN. Call bell within reach and encouraged to use when assistance needed. Will continue to monitor.

## 2020-02-12 NOTE — ED ADULT NURSE REASSESSMENT NOTE - NURSING MUSC JOINTS
no pain, swelling or deformity of joints

## 2020-02-12 NOTE — ED ADULT NURSE REASSESSMENT NOTE - COMFORT CARE
po fluids offered/repositioned/side rails up/warm blanket provided/darkened lights/plan of care explained/meal provided
plan of care explained/meal provided/assisted to bedpan/po fluids offered/wait time explained
plan of care explained/po fluids offered/meal provided/wait time explained
wait time explained/plan of care explained/po fluids offered/meal provided
po fluids offered/meal provided/wait time explained/plan of care explained
plan of care explained/po fluids offered/darkened lights/meal provided/side rails up/repositioned/wait time explained

## 2020-02-12 NOTE — PHYSICAL THERAPY INITIAL EVALUATION ADULT - ADDITIONAL COMMENTS
Patient lives in a private house with no DEBO and no stairs inside. She lives with her son and daughter-in-law, and between the two of them are able to provide assistance as needed. She was independent prior to admission, using a RW for ambulation. She also has a wheelchair, and shower chair.

## 2020-02-12 NOTE — ED CDU PROVIDER DISPOSITION NOTE - CLINICAL COURSE
87yo F pmhx GERD, dementia, HTN, afib (not on AC), frequent falls presented to ED s/p fall at home. As per son at bedside pt was getting out of bed and reaching for walker but fell backward, hitting top of her head on night stand. No LOC, not on AC. In ED, pt complained of neck and back pain, CT head and c-spine negative, xray L spine showed collapse of T11, T12 and L4. Pt also noted to have lac to scalp, repaired in ED.  Had MRI today, has sub acute fx T2, otherwise other fractures are chronic.  Evaluated by PT was able to ambulate independently with RW >100", with TLSO brace.  Patient and son will not continue to wait to see Dr. Deal as he is in OR and they have been waiting 3 hours already.  UTI was on Rocephin, came be converted to keflex po, f/u PCP.

## 2020-02-12 NOTE — ED CDU PROVIDER SUBSEQUENT DAY NOTE - PHYSICAL EXAMINATION
Const: In no acute distress. Frail appearing female  HEENT: NC/AT. Moist mucous membranes.  Eyes: No scleral icterus. EOMI.  Neck:. Soft and supple. Full ROM without pain. Paracervical ttp b/l  Cardiac: Irregular rate and rhythm. +S1/S2. Peripheral pulses 2+ and symmetric.   Resp: Speaking in full sentences. No evidence of respiratory distress. No wheezes, rales or rhonchi.  Abd: Soft, non-tender, non-distended. Normal bowel sounds in all 4 quadrants. No guarding or rebound.  Back: Spine midline and non-tender, no step offs. No CVAT. TTP paraspinal lumbar region  MSK: No edema, no visible deformities, full range of motion in all 4 extremities  Neuro: No focal neurologic deficits, 5/5 strength to all extremities, sensation intact, CN 2-12 intact   Skin: +Laceration to scalp, repaired with staples. No rashes, abrasions
Const: In no acute distress. Frail appearing female  HEENT: NC/AT. Moist mucous membranes.  Eyes: No scleral icterus. EOMI.  Neck:. Soft and supple. Full ROM without pain. Paracervical ttp b/l  Cardiac: Irregular rate and rhythm. +S1/S2. Peripheral pulses 2+ and symmetric.   Resp: Speaking in full sentences. No evidence of respiratory distress. No wheezes, rales or rhonchi.  Abd: Soft, non-tender, non-distended. Normal bowel sounds in all 4 quadrants. No guarding or rebound.  Back: Spine midline and non-tender, no step offs. No CVAT. TTP paraspinal lumbar region  MSK: No edema, no visible deformities, full range of motion in all 4 extremities  Neuro: No focal neurologic deficits, 5/5 strength to all extremities, sensation intact, CN 2-12 intact   Skin: +Laceration to scalp, repaired with staples. No rashes, abrasions

## 2020-02-12 NOTE — ED CDU PROVIDER SUBSEQUENT DAY NOTE - ATTENDING CONTRIBUTION TO CARE
I, Meeta Miller, performed a face to face bedside interview with this patient regarding history of present illness, review of symptoms and relevant past medical, social and family history.  I completed an independent physical examination. Medical decision making, follow-up on ordered tests (ie labs, radiologic studies) and re-evaluation of the patient's status has been communicated to the ACP.  Disposition of the patient will be based on test outcome and response to ED interventions.     Pt with fall with compression fx pending MRI, no cord compression clinically. patient noted to have fever overnight and slight drop in BP this AM. being tx for UTI on ceftriaxone, given 1 L bolus. patient mentating. no acute distress. will recheck BP after bolus and reasses.
Narendra MCINTOSH- 85 Y/O F with fall and fx of t11-12 placed in cdu for mri and  brace, no events overnight, awaiting MRI to be performed    pt is well appearing elderly female, sleeping well, no events per nurse last night, will continue to f/u on MRI

## 2020-02-12 NOTE — ED CDU PROVIDER DISPOSITION NOTE - PATIENT PORTAL LINK FT
You can access the FollowMyHealth Patient Portal offered by [facility] by registering at the following website: [email or web address]. By joining [facility]’s FollowMyHealth portal, you will also be able to view your health information using other applications (apps) compatible with our system.

## 2020-02-12 NOTE — PROGRESS NOTE ADULT - PROBLEM SELECTOR PLAN 1
Fit/dispensed TLSO and socks.  All went without incident.  Demonstrated/discussed use.  Pt. to use as directed by   Please call [facility] with any questions.  [phone].

## 2020-02-12 NOTE — PROGRESS NOTE ADULT - SUBJECTIVE AND OBJECTIVE BOX
ORTHO-SPINE PROGRESS NOTE:    Pt Name: ADRIÁN MARTE    MRN: [id number]      Patient is an 85 yo F being followed for T11/12 and L4 compression fractures. Patient is pending MRI at this time. No acute events reported overnight. Patient was seen and evaluated at bedside. Patient found resting comfortably in bed. Patient denies pain at this time. Patient does not respond to questions or participate in exam this morning thus limiting encounter. As per ED staff, patient is demented at baseline.      PAST MEDICAL & SURGICAL HISTORY:  Closed fracture of lower extremity, unspecified laterality, initial encounter  Fall, initial encounter  Breast cancer  Hypertension  Atrial fibrillation  S/P partial mastectomy, right      Allergies: No Known Allergies      Medications: acetaminophen   Tablet .. 650 milliGRAM(s) Oral every 6 hours PRN  ALPRAZolam 0.5 milliGRAM(s) Oral three times a day  aspirin enteric coated 325 milliGRAM(s) Oral daily  cefTRIAXone   IVPB 1000 milliGRAM(s) IV Intermittent every 24 hours  cholecalciferol 1000 Unit(s) Oral daily  enoxaparin Injectable 40 milliGRAM(s) SubCutaneous daily  ferrous    sulfate 325 milliGRAM(s) Oral daily  folic acid 1 milliGRAM(s) Oral daily  latanoprost 0.005% Ophthalmic Solution 1 Drop(s) Both EYES at bedtime  pantoprazole    Tablet 40 milliGRAM(s) Oral before breakfast  sodium chloride 0.9%. 1000 milliLiter(s) IV Continuous <Continuous>  venlafaxine 37.5 milliGRAM(s) Oral daily              PHYSICAL EXAM:    Vital Signs Last 24 Hrs  T(C): 36.8 (12 Feb 2020 11:18), Max: 37.2 (11 Feb 2020 19:17)  T(F): 98.2 (12 Feb 2020 11:18), Max: 98.9 (11 Feb 2020 19:17)  HR: 94 (12 Feb 2020 11:18) (83 - 106)  BP: 111/58 (12 Feb 2020 11:18) (100/64 - 131/80)  BP(mean): --  RR: 18 (12 Feb 2020 11:18) (18 - 20)  SpO2: 97% (12 Feb 2020 11:18) (94% - 97%)  Daily     Daily     Appearance: Patient appears stated age and is somnolent. NAD.      Neurological: Unable to assess as patient is not cooperative.     Skin: no rash on visible skin. Skin is clean, dry and intact.    Musculoskeletal:  No appreciable TTP of spine. Calves supple. Patient spontaneously moves bilateral LEs.       A/P:  Pt is a 86y Female with multiple compression fractures.      PLAN:   -Pain control.  -Obtain TLSO brace.  -WBAT in TLSO brace.  -Obtain T/L spine MRIs.  -Bedside PT.   -Further plan pending MRI results.

## 2020-02-12 NOTE — PHYSICAL THERAPY INITIAL EVALUATION ADULT - PASSIVE RANGE OF MOTION EXAMINATION, REHAB EVAL
Right UE Passive ROM was WFL (within functional limits)/Left LE Passive ROM was WFL (within functional limits)/Right LE Passive ROM was WFL (within functional limits)/Left UE Passive ROM was WFL (within functional limits)

## 2020-02-12 NOTE — ED ADULT NURSE REASSESSMENT NOTE - NURSING MUSC STRENGTH
hand grasp, leg strength strong and equal bilaterally

## 2020-02-12 NOTE — ED CDU PROVIDER DISPOSITION NOTE - CARE PROVIDER_API CALL
Leonel Deal (DO)  Orthopaedic Surgery  [address]  [place] [address]  Phone: [phone]  Fax: [phone]  Follow Up Time: 7-10 Days

## 2020-02-12 NOTE — ED CDU PROVIDER SUBSEQUENT DAY NOTE - TOBACCO USE
Pt here for port flush today. Central Line Type: Port  Central Line Site: Right   Port cleansed with ChloraPrep per protocol.  Accessed via: 20 gauge Miranda needle  Patency Verification: No blood return or less than 3 ml before treatment    Darryn JUNIOR aware of no blood return in port. Darryn Aiken said that if patient wants, we can put in order to remove port, since we arent using it for treatment. When discussed with patient, she really wants to keep it in and wants to put activase in. Order placed for activase.    Right  central line site  Failed Access: unable to aspirate at least 3 ml from CVAD  Patient Repositioned: side to side, sitting forward, lying flat without a pillow and arm behind neck  Primary Interventions: patient cough and deep breath and aspiration with a 10 ml syringe  Darryn JUNIOR notified of occluded central line.  Secondary Interventions: Alteplase instilled using a 10 ml syringe and positive pressure, 2 mg (2 ml) alteplase instilled and Alteplase dwelled at least 30 minutes in CVAD  After 30 minutes, no blood return noted. Pt did not want to wait any longer. Activase removed.   Will be discussed further in future with patient that port needs to be removed.   Patient Discharge: Stable and Follow up appointment scheduled    RN discussed with patient that it would be a good idea to remove port, since we are unable to get blood return. Pt agreed to have port removed. Order per Darryn JUNIOR to remove port, with whomever inserted it. Order for IR Port for removal    Miranda needle removed: Yes  Deaccess/site appearance: Clear (no redness, tenderness, or edema), dressing applied if required  Discharged: Stable and Follow up appointment scheduled    Dr. Smith is supervising clinician today.  
Unknown if ever smoked
Unknown if ever smoked

## 2020-02-12 NOTE — ED ADULT NURSE REASSESSMENT NOTE - NURSING MUSC ROM
full range of motion in all extremities

## 2020-02-12 NOTE — ED CDU PROVIDER DISPOSITION NOTE - ATTENDING CONTRIBUTION TO CARE
Narendra MCINTOSH- 85 Y/O F with fall and fx of t11-12 placed in cdu for mri and  brace, no events overnight, awaiting MRI to be performed    pt is well appearing elderly female, sleeping well, no events per nurse last night, MRI showed no retropulsion

## 2020-02-12 NOTE — PHYSICAL THERAPY INITIAL EVALUATION ADULT - CRITERIA FOR SKILLED THERAPEUTIC INTERVENTIONS
Impairments in strength noted, despite these, patient independent with use of RW. PT will not follow

## 2020-02-16 LAB
CULTURE RESULTS: SIGNIFICANT CHANGE UP
CULTURE RESULTS: SIGNIFICANT CHANGE UP
SPECIMEN SOURCE: SIGNIFICANT CHANGE UP
SPECIMEN SOURCE: SIGNIFICANT CHANGE UP

## 2020-11-30 NOTE — ED ADULT NURSE NOTE - NS PRO PASSIVE SMOKE EXP
[de-identified] : 33 Unknown

## 2022-02-24 NOTE — ED ADULT NURSE REASSESSMENT NOTE - ANCILLARY STATUS
awaiting radiology/MRI
awaiting MRI/awaiting radiology
MRI/awaiting radiology
Mir: Patient well appearing. Vitally stable. Given albuterol with spacer. Patient's mother understands return precautions and f/u.
